# Patient Record
Sex: MALE | Race: OTHER | NOT HISPANIC OR LATINO | Employment: OTHER | URBAN - METROPOLITAN AREA
[De-identification: names, ages, dates, MRNs, and addresses within clinical notes are randomized per-mention and may not be internally consistent; named-entity substitution may affect disease eponyms.]

---

## 2020-04-09 ENCOUNTER — HOSPITAL ENCOUNTER (OUTPATIENT)
Facility: HOSPITAL | Age: 55
Discharge: HOME | End: 2020-04-10
Attending: EMERGENCY MEDICINE | Admitting: FAMILY MEDICINE
Payer: MEDICAID

## 2020-04-09 DIAGNOSIS — R00.1 BRADYCARDIA: ICD-10-CM

## 2020-04-09 DIAGNOSIS — R07.9 CHEST PAIN, UNSPECIFIED TYPE: ICD-10-CM

## 2020-04-09 DIAGNOSIS — I25.10 CORONARY ARTERY DISEASE, ANGINA PRESENCE UNSPECIFIED, UNSPECIFIED VESSEL OR LESION TYPE, UNSPECIFIED WHETHER NATIVE OR TRANSPLANTED HEART: ICD-10-CM

## 2020-04-09 DIAGNOSIS — R07.9 CHEST PAIN: Primary | ICD-10-CM

## 2020-04-09 PROBLEM — Z95.5 H/O HEART ARTERY STENT: Status: ACTIVE | Noted: 2020-04-09

## 2020-04-09 LAB
ALBUMIN SERPL BCP-MCNC: 3.7 G/DL (ref 3.5–5.2)
ALP SERPL-CCNC: 90 U/L (ref 55–135)
ALT SERPL W/O P-5'-P-CCNC: 14 U/L (ref 10–44)
ANION GAP SERPL CALC-SCNC: 8 MMOL/L (ref 8–16)
AST SERPL-CCNC: 17 U/L (ref 10–40)
BASOPHILS # BLD AUTO: 0.06 K/UL (ref 0–0.2)
BASOPHILS NFR BLD: 1 % (ref 0–1.9)
BILIRUB SERPL-MCNC: 0.3 MG/DL (ref 0.1–1)
BNP SERPL-MCNC: 140 PG/ML (ref 0–99)
BUN SERPL-MCNC: 17 MG/DL (ref 6–20)
CALCIUM SERPL-MCNC: 9.3 MG/DL (ref 8.7–10.5)
CHLORIDE SERPL-SCNC: 110 MMOL/L (ref 95–110)
CO2 SERPL-SCNC: 21 MMOL/L (ref 23–29)
CREAT SERPL-MCNC: 1.3 MG/DL (ref 0.5–1.4)
DIFFERENTIAL METHOD: ABNORMAL
EOSINOPHIL # BLD AUTO: 0.6 K/UL (ref 0–0.5)
EOSINOPHIL NFR BLD: 9.2 % (ref 0–8)
ERYTHROCYTE [DISTWIDTH] IN BLOOD BY AUTOMATED COUNT: 15.2 % (ref 11.5–14.5)
EST. GFR  (AFRICAN AMERICAN): >60 ML/MIN/1.73 M^2
EST. GFR  (NON AFRICAN AMERICAN): >60 ML/MIN/1.73 M^2
GLUCOSE SERPL-MCNC: 108 MG/DL (ref 70–110)
HCT VFR BLD AUTO: 37.7 % (ref 40–54)
HGB BLD-MCNC: 12.2 G/DL (ref 14–18)
IMM GRANULOCYTES # BLD AUTO: 0.02 K/UL (ref 0–0.04)
IMM GRANULOCYTES NFR BLD AUTO: 0.3 % (ref 0–0.5)
LYMPHOCYTES # BLD AUTO: 2.5 K/UL (ref 1–4.8)
LYMPHOCYTES NFR BLD: 40.4 % (ref 18–48)
MCH RBC QN AUTO: 27.7 PG (ref 27–31)
MCHC RBC AUTO-ENTMCNC: 32.4 G/DL (ref 32–36)
MCV RBC AUTO: 86 FL (ref 82–98)
MONOCYTES # BLD AUTO: 0.6 K/UL (ref 0.3–1)
MONOCYTES NFR BLD: 9.3 % (ref 4–15)
NEUTROPHILS # BLD AUTO: 2.5 K/UL (ref 1.8–7.7)
NEUTROPHILS NFR BLD: 39.8 % (ref 38–73)
NRBC BLD-RTO: 0 /100 WBC
PLATELET # BLD AUTO: 245 K/UL (ref 150–350)
PMV BLD AUTO: 9.3 FL (ref 9.2–12.9)
POTASSIUM SERPL-SCNC: 4.2 MMOL/L (ref 3.5–5.1)
PROT SERPL-MCNC: 7.2 G/DL (ref 6–8.4)
RBC # BLD AUTO: 4.4 M/UL (ref 4.6–6.2)
SARS-COV-2 RDRP RESP QL NAA+PROBE: NEGATIVE
SODIUM SERPL-SCNC: 139 MMOL/L (ref 136–145)
TROPONIN I SERPL DL<=0.01 NG/ML-MCNC: 0.02 NG/ML (ref 0–0.03)
WBC # BLD AUTO: 6.22 K/UL (ref 3.9–12.7)

## 2020-04-09 PROCEDURE — 85025 COMPLETE CBC W/AUTO DIFF WBC: CPT

## 2020-04-09 PROCEDURE — 96372 THER/PROPH/DIAG INJ SC/IM: CPT

## 2020-04-09 PROCEDURE — 83880 ASSAY OF NATRIURETIC PEPTIDE: CPT

## 2020-04-09 PROCEDURE — 84484 ASSAY OF TROPONIN QUANT: CPT

## 2020-04-09 PROCEDURE — 25000003 PHARM REV CODE 250: Performed by: PHYSICIAN ASSISTANT

## 2020-04-09 PROCEDURE — 63600175 PHARM REV CODE 636 W HCPCS: Performed by: NURSE PRACTITIONER

## 2020-04-09 PROCEDURE — U0002 COVID-19 LAB TEST NON-CDC: HCPCS

## 2020-04-09 PROCEDURE — 93010 EKG 12-LEAD: ICD-10-PCS | Mod: ,,, | Performed by: INTERNAL MEDICINE

## 2020-04-09 PROCEDURE — 99285 EMERGENCY DEPT VISIT HI MDM: CPT | Mod: 25

## 2020-04-09 PROCEDURE — 93010 ELECTROCARDIOGRAM REPORT: CPT | Mod: ,,, | Performed by: INTERNAL MEDICINE

## 2020-04-09 PROCEDURE — G0378 HOSPITAL OBSERVATION PER HR: HCPCS

## 2020-04-09 PROCEDURE — 80053 COMPREHEN METABOLIC PANEL: CPT

## 2020-04-09 PROCEDURE — 93005 ELECTROCARDIOGRAM TRACING: CPT

## 2020-04-09 RX ORDER — ATORVASTATIN CALCIUM 80 MG/1
80 TABLET, FILM COATED ORAL DAILY
COMMUNITY

## 2020-04-09 RX ORDER — ASPIRIN 325 MG
325 TABLET ORAL
Status: COMPLETED | OUTPATIENT
Start: 2020-04-09 | End: 2020-04-09

## 2020-04-09 RX ORDER — CARVEDILOL 3.12 MG/1
3.12 TABLET ORAL 2 TIMES DAILY WITH MEALS
Status: ON HOLD | COMMUNITY
End: 2020-04-10 | Stop reason: HOSPADM

## 2020-04-09 RX ORDER — LOSARTAN POTASSIUM 100 MG/1
100 TABLET ORAL DAILY
COMMUNITY

## 2020-04-09 RX ORDER — ENOXAPARIN SODIUM 100 MG/ML
40 INJECTION SUBCUTANEOUS EVERY 24 HOURS
Status: DISCONTINUED | OUTPATIENT
Start: 2020-04-09 | End: 2020-04-10 | Stop reason: HOSPADM

## 2020-04-09 RX ORDER — ONDANSETRON 2 MG/ML
4 INJECTION INTRAMUSCULAR; INTRAVENOUS EVERY 6 HOURS PRN
Status: DISCONTINUED | OUTPATIENT
Start: 2020-04-09 | End: 2020-04-10 | Stop reason: HOSPADM

## 2020-04-09 RX ORDER — MESALAMINE 500 MG/1
400 CAPSULE, EXTENDED RELEASE ORAL
COMMUNITY

## 2020-04-09 RX ORDER — NITROGLYCERIN 0.4 MG/1
0.4 TABLET SUBLINGUAL EVERY 5 MIN PRN
Status: DISCONTINUED | OUTPATIENT
Start: 2020-04-09 | End: 2020-04-10 | Stop reason: HOSPADM

## 2020-04-09 RX ORDER — NAPROXEN SODIUM 220 MG/1
81 TABLET, FILM COATED ORAL DAILY
Status: DISCONTINUED | OUTPATIENT
Start: 2020-04-10 | End: 2020-04-10 | Stop reason: HOSPADM

## 2020-04-09 RX ORDER — PANTOPRAZOLE SODIUM 40 MG/1
40 TABLET, DELAYED RELEASE ORAL DAILY
COMMUNITY

## 2020-04-09 RX ORDER — ACETAMINOPHEN 325 MG/1
650 TABLET ORAL EVERY 6 HOURS PRN
Status: DISCONTINUED | OUTPATIENT
Start: 2020-04-09 | End: 2020-04-10 | Stop reason: HOSPADM

## 2020-04-09 RX ORDER — ASPIRIN 81 MG/1
81 TABLET ORAL DAILY
COMMUNITY

## 2020-04-09 RX ORDER — CLOPIDOGREL BISULFATE 75 MG/1
75 TABLET ORAL DAILY
COMMUNITY

## 2020-04-09 RX ADMIN — ASPIRIN 325 MG ORAL TABLET 325 MG: 325 PILL ORAL at 07:04

## 2020-04-09 RX ADMIN — ENOXAPARIN SODIUM 40 MG: 100 INJECTION SUBCUTANEOUS at 11:04

## 2020-04-10 VITALS
OXYGEN SATURATION: 98 % | RESPIRATION RATE: 18 BRPM | SYSTOLIC BLOOD PRESSURE: 126 MMHG | WEIGHT: 158.94 LBS | HEIGHT: 66 IN | BODY MASS INDEX: 25.54 KG/M2 | DIASTOLIC BLOOD PRESSURE: 75 MMHG | HEART RATE: 58 BPM | TEMPERATURE: 98 F

## 2020-04-10 PROBLEM — I50.42 CHRONIC COMBINED SYSTOLIC AND DIASTOLIC CONGESTIVE HEART FAILURE: Status: ACTIVE | Noted: 2020-04-10

## 2020-04-10 PROBLEM — R00.1 BRADYCARDIA: Status: ACTIVE | Noted: 2020-04-10

## 2020-04-10 PROBLEM — R07.9 CHEST PAIN: Status: RESOLVED | Noted: 2020-04-09 | Resolved: 2020-04-10

## 2020-04-10 PROBLEM — J45.909 ASTHMA: Status: ACTIVE | Noted: 2020-04-10

## 2020-04-10 PROBLEM — I10 HYPERTENSION: Status: ACTIVE | Noted: 2020-04-10

## 2020-04-10 PROBLEM — I50.43 ACUTE ON CHRONIC COMBINED SYSTOLIC AND DIASTOLIC CONGESTIVE HEART FAILURE: Status: ACTIVE | Noted: 2020-04-10

## 2020-04-10 LAB
ALBUMIN SERPL BCP-MCNC: 3.8 G/DL (ref 3.5–5.2)
ALP SERPL-CCNC: 96 U/L (ref 55–135)
ALT SERPL W/O P-5'-P-CCNC: 15 U/L (ref 10–44)
ANION GAP SERPL CALC-SCNC: 8 MMOL/L (ref 8–16)
AORTIC ROOT ANNULUS: 3.46 CM
AORTIC VALVE CUSP SEPERATION: 1.38 CM
AST SERPL-CCNC: 18 U/L (ref 10–40)
AV INDEX (PROSTH): 0.74
AV MEAN GRADIENT: 6 MMHG
AV PEAK GRADIENT: 12 MMHG
AV REGURGITATION PRESSURE HALF TIME: 618 MS
AV VALVE AREA: 2.43 CM2
AV VELOCITY RATIO: 0.61
BASOPHILS # BLD AUTO: 0.03 K/UL (ref 0–0.2)
BASOPHILS NFR BLD: 0.5 % (ref 0–1.9)
BILIRUB SERPL-MCNC: 0.4 MG/DL (ref 0.1–1)
BSA FOR ECHO PROCEDURE: 1.83 M2
BUN SERPL-MCNC: 14 MG/DL (ref 6–20)
CALCIUM SERPL-MCNC: 9.5 MG/DL (ref 8.7–10.5)
CHLORIDE SERPL-SCNC: 111 MMOL/L (ref 95–110)
CO2 SERPL-SCNC: 22 MMOL/L (ref 23–29)
CREAT SERPL-MCNC: 1.3 MG/DL (ref 0.5–1.4)
CV ECHO LV RWT: 0.24 CM
DIFFERENTIAL METHOD: ABNORMAL
DOP CALC AO PEAK VEL: 1.73 M/S
DOP CALC AO VTI: 37.78 CM
DOP CALC LVOT AREA: 3.3 CM2
DOP CALC LVOT DIAMETER: 2.04 CM
DOP CALC LVOT PEAK VEL: 1.05 M/S
DOP CALC LVOT STROKE VOLUME: 91.77 CM3
DOP CALCLVOT PEAK VEL VTI: 28.09 CM
E WAVE DECELERATION TIME: 208.36 MSEC
E/A RATIO: 0.82
E/E' RATIO: 12.36 M/S
ECHO LV POSTERIOR WALL: 0.6 CM (ref 0.6–1.1)
EOSINOPHIL # BLD AUTO: 0.5 K/UL (ref 0–0.5)
EOSINOPHIL NFR BLD: 8.8 % (ref 0–8)
ERYTHROCYTE [DISTWIDTH] IN BLOOD BY AUTOMATED COUNT: 15.3 % (ref 11.5–14.5)
EST. GFR  (AFRICAN AMERICAN): >60 ML/MIN/1.73 M^2
EST. GFR  (NON AFRICAN AMERICAN): >60 ML/MIN/1.73 M^2
FRACTIONAL SHORTENING: 15 % (ref 28–44)
GLUCOSE SERPL-MCNC: 93 MG/DL (ref 70–110)
HCT VFR BLD AUTO: 40.8 % (ref 40–54)
HGB BLD-MCNC: 13.1 G/DL (ref 14–18)
IMM GRANULOCYTES # BLD AUTO: 0.02 K/UL (ref 0–0.04)
IMM GRANULOCYTES NFR BLD AUTO: 0.3 % (ref 0–0.5)
INTERVENTRICULAR SEPTUM: 0.9 CM (ref 0.6–1.1)
IVRT: 114.18 MSEC
LA MAJOR: 4.85 CM
LA MINOR: 4.61 CM
LA WIDTH: 3.62 CM
LEFT ATRIUM SIZE: 3.7 CM
LEFT ATRIUM VOLUME INDEX: 29.7 ML/M2
LEFT ATRIUM VOLUME: 53.82 CM3
LEFT INTERNAL DIMENSION IN SYSTOLE: 4.34 CM (ref 2.1–4)
LEFT VENTRICLE DIASTOLIC VOLUME INDEX: 74.71 ML/M2
LEFT VENTRICLE DIASTOLIC VOLUME: 135.53 ML
LEFT VENTRICLE MASS INDEX: 71 G/M2
LEFT VENTRICLE SYSTOLIC VOLUME INDEX: 46.9 ML/M2
LEFT VENTRICLE SYSTOLIC VOLUME: 85.03 ML
LEFT VENTRICULAR INTERNAL DIMENSION IN DIASTOLE: 5.1 CM (ref 3.5–6)
LEFT VENTRICULAR MASS: 129.43 G
LV LATERAL E/E' RATIO: 8.5 M/S
LV SEPTAL E/E' RATIO: 22.67 M/S
LYMPHOCYTES # BLD AUTO: 2.6 K/UL (ref 1–4.8)
LYMPHOCYTES NFR BLD: 42.1 % (ref 18–48)
MAGNESIUM SERPL-MCNC: 2 MG/DL (ref 1.6–2.6)
MCH RBC QN AUTO: 27.3 PG (ref 27–31)
MCHC RBC AUTO-ENTMCNC: 32.1 G/DL (ref 32–36)
MCV RBC AUTO: 85 FL (ref 82–98)
MONOCYTES # BLD AUTO: 0.5 K/UL (ref 0.3–1)
MONOCYTES NFR BLD: 8 % (ref 4–15)
MV PEAK A VEL: 0.83 M/S
MV PEAK E VEL: 0.68 M/S
NEUTROPHILS # BLD AUTO: 2.5 K/UL (ref 1.8–7.7)
NEUTROPHILS NFR BLD: 40.3 % (ref 38–73)
NRBC BLD-RTO: 0 /100 WBC
PHOSPHATE SERPL-MCNC: 2.8 MG/DL (ref 2.7–4.5)
PISA TR MAX VEL: 1.74 M/S
PLATELET # BLD AUTO: 246 K/UL (ref 150–350)
PMV BLD AUTO: 8.9 FL (ref 9.2–12.9)
POTASSIUM SERPL-SCNC: 4.2 MMOL/L (ref 3.5–5.1)
PROT SERPL-MCNC: 7.5 G/DL (ref 6–8.4)
PULM VEIN S/D RATIO: 0.97
PV PEAK D VEL: 0.34 M/S
PV PEAK S VEL: 0.33 M/S
RA MAJOR: 4.65 CM
RA PRESSURE: 3 MMHG
RA WIDTH: 2.85 CM
RBC # BLD AUTO: 4.79 M/UL (ref 4.6–6.2)
RIGHT VENTRICULAR END-DIASTOLIC DIMENSION: 2.51 CM
SODIUM SERPL-SCNC: 141 MMOL/L (ref 136–145)
TDI LATERAL: 0.08 M/S
TDI SEPTAL: 0.03 M/S
TDI: 0.06 M/S
TR MAX PG: 12 MMHG
TROPONIN I SERPL DL<=0.01 NG/ML-MCNC: 0.02 NG/ML (ref 0–0.03)
TROPONIN I SERPL DL<=0.01 NG/ML-MCNC: 0.02 NG/ML (ref 0–0.03)
TV REST PULMONARY ARTERY PRESSURE: 15 MMHG
WBC # BLD AUTO: 6.15 K/UL (ref 3.9–12.7)

## 2020-04-10 PROCEDURE — 25000003 PHARM REV CODE 250: Performed by: NURSE PRACTITIONER

## 2020-04-10 PROCEDURE — 83735 ASSAY OF MAGNESIUM: CPT

## 2020-04-10 PROCEDURE — G0378 HOSPITAL OBSERVATION PER HR: HCPCS

## 2020-04-10 PROCEDURE — 84100 ASSAY OF PHOSPHORUS: CPT

## 2020-04-10 PROCEDURE — 80053 COMPREHEN METABOLIC PANEL: CPT

## 2020-04-10 PROCEDURE — 99220 PR INITIAL OBSERVATION CARE,LEVL III: ICD-10-PCS | Mod: 25,,, | Performed by: INTERNAL MEDICINE

## 2020-04-10 PROCEDURE — 94761 N-INVAS EAR/PLS OXIMETRY MLT: CPT

## 2020-04-10 PROCEDURE — 36415 COLL VENOUS BLD VENIPUNCTURE: CPT

## 2020-04-10 PROCEDURE — 99220 PR INITIAL OBSERVATION CARE,LEVL III: CPT | Mod: 25,,, | Performed by: INTERNAL MEDICINE

## 2020-04-10 PROCEDURE — 85025 COMPLETE CBC W/AUTO DIFF WBC: CPT

## 2020-04-10 PROCEDURE — 84484 ASSAY OF TROPONIN QUANT: CPT | Mod: 91

## 2020-04-10 PROCEDURE — 84484 ASSAY OF TROPONIN QUANT: CPT

## 2020-04-10 PROCEDURE — 94760 N-INVAS EAR/PLS OXIMETRY 1: CPT

## 2020-04-10 RX ORDER — ATORVASTATIN CALCIUM 40 MG/1
80 TABLET, FILM COATED ORAL DAILY
Status: DISCONTINUED | OUTPATIENT
Start: 2020-04-10 | End: 2020-04-10 | Stop reason: HOSPADM

## 2020-04-10 RX ORDER — ISOSORBIDE MONONITRATE 30 MG/1
30 TABLET, EXTENDED RELEASE ORAL DAILY
Status: DISCONTINUED | OUTPATIENT
Start: 2020-04-10 | End: 2020-04-10 | Stop reason: HOSPADM

## 2020-04-10 RX ORDER — LOSARTAN POTASSIUM 50 MG/1
100 TABLET ORAL DAILY
Status: DISCONTINUED | OUTPATIENT
Start: 2020-04-10 | End: 2020-04-10 | Stop reason: HOSPADM

## 2020-04-10 RX ORDER — CLOPIDOGREL BISULFATE 75 MG/1
75 TABLET ORAL DAILY
Status: DISCONTINUED | OUTPATIENT
Start: 2020-04-10 | End: 2020-04-10 | Stop reason: HOSPADM

## 2020-04-10 RX ORDER — ISOSORBIDE MONONITRATE 30 MG/1
30 TABLET, EXTENDED RELEASE ORAL DAILY
Qty: 30 TABLET | Refills: 11 | Status: ON HOLD | OUTPATIENT
Start: 2020-04-10 | End: 2021-01-07 | Stop reason: HOSPADM

## 2020-04-10 RX ORDER — PANTOPRAZOLE SODIUM 40 MG/1
40 TABLET, DELAYED RELEASE ORAL DAILY
Status: DISCONTINUED | OUTPATIENT
Start: 2020-04-10 | End: 2020-04-10 | Stop reason: HOSPADM

## 2020-04-10 RX ORDER — NITROGLYCERIN 0.4 MG/1
0.4 TABLET SUBLINGUAL EVERY 5 MIN PRN
Qty: 30 TABLET | Refills: 0 | Status: SHIPPED | OUTPATIENT
Start: 2020-04-10 | End: 2021-04-10

## 2020-04-10 RX ADMIN — ATORVASTATIN CALCIUM 80 MG: 40 TABLET, FILM COATED ORAL at 08:04

## 2020-04-10 RX ADMIN — CLOPIDOGREL BISULFATE 75 MG: 75 TABLET ORAL at 08:04

## 2020-04-10 RX ADMIN — PANTOPRAZOLE SODIUM 40 MG: 40 TABLET, DELAYED RELEASE ORAL at 08:04

## 2020-04-10 RX ADMIN — ACETAMINOPHEN 650 MG: 325 TABLET ORAL at 12:04

## 2020-04-10 RX ADMIN — LOSARTAN POTASSIUM 100 MG: 50 TABLET ORAL at 08:04

## 2020-04-10 RX ADMIN — ASPIRIN 81 MG 81 MG: 81 TABLET ORAL at 08:04

## 2020-04-10 NOTE — H&P
[facility] Medicine  History & Physical    Patient Name: Beau Cowart  MRN: [id number]  Admission Date: 4/9/2020  Attending Physician: Shirley Muñoz*   Primary Care Provider: Primary Doctor No         Patient information was obtained from patient, past medical records and ER records.     Subjective:     Principal Problem:Chest pain    Chief Complaint:   Chief Complaint   Patient presents with    Chest Pain     c/o pain to his entire chest and headache x3 days. pain has been improving with nitro, but states he is now also feeling weak. history of cardiac bypass surgery and of needing 2 cardiac stents in the past        HPI: 54 y.o. male with past medical history of CAD s/p stents, asthma, colitis who  presents to the ED for evaluation of left-sided, intermittent, non- radiating chest pain with onset about 3 days ago.  Patient states that the pain is stabbing in nature. He states that when he takes his nitroglycerin medicine it improves the pain for some period of time as well as rest.  When the pain returns he reports associated headaches and weakness. He reports pain usually last 2-3 minutes.  Denies any shortness of breath, fever, chills, cough, congestion, nausea, vomiting, change in bladder habits or change in bowel habits.  ED findings: troponin negative , covid negative, EKG with ST depressions.  Patient admitted to [facility] medicine observation service, under my care, for further medical management.    Past Medical History:   Diagnosis Date    Asthma     Colitis     Coronary artery disease     Hypertension        Past Surgical History:   Procedure Laterality Date    CARDIAC SURGERY         Review of patient's allergies indicates:   Allergen Reactions    Singulair [montelukast] Rash       No current facility-administered medications on file prior to encounter.      Current Outpatient Medications on File Prior to Encounter   Medication Sig    aspirin  (ECOTRIN) 81 MG EC tablet Take 81 mg by mouth once daily.    atorvastatin (LIPITOR) 80 MG tablet Take 80 mg by mouth once daily.    carvediloL (COREG) 3.125 MG tablet Take 3.125 mg by mouth 2 (two) times daily with meals.    clopidogreL (PLAVIX) 75 mg tablet Take 75 mg by mouth once daily.    losartan (COZAAR) 100 MG tablet Take 100 mg by mouth once daily.    mesalamine (PENTASA) 500 MG CR capsule Take 400 mg by mouth. Takes two in the AM and 1 at night.    pantoprazole (PROTONIX) 40 MG tablet Take 40 mg by mouth once daily.     Family History     None        Tobacco Use    Smoking status: Former Smoker    Smokeless tobacco: Never Used   Substance and Sexual Activity    Alcohol use: Never     Frequency: Never    Drug use: Not on file    Sexual activity: Not on file     Review of Systems   Constitutional: Negative for chills and fever.   HENT: Negative for congestion, postnasal drip and rhinorrhea.    Eyes: Negative for visual disturbance.   Respiratory: Negative for chest tightness and shortness of breath.    Cardiovascular: Positive for chest pain. Negative for palpitations.   Gastrointestinal: Negative for abdominal distention, abdominal pain, nausea and vomiting.   Genitourinary: Negative for difficulty urinating.   Musculoskeletal: Negative for arthralgias and myalgias.   Skin: Negative for color change and wound.   Neurological: Positive for weakness and headaches.   Hematological: Does not bruise/bleed easily.   Psychiatric/Behavioral: Negative for agitation.     Objective:     Vital Signs (Most Recent):  Temp: 98 °F (36.7 °C) (04/09/20 2323)  Pulse: (!) 46 (04/09/20 2323)  Resp: 16 (04/09/20 2323)  BP: 136/76 (04/09/20 2323)  SpO2: 99 % (04/09/20 2323) Vital Signs (24h Range):  Temp:  [98 °F (36.7 °C)-98.6 °F (37 °C)] 98 °F (36.7 °C)  Pulse:  [46-62] 46  Resp:  [16-18] 16  SpO2:  [98 %-99 %] 99 %  BP: (134-136)/(68-78) 136/76     Weight: 72.1 kg (158 lb 15.2 oz)  Body mass index is 25.66  kg/m².    Physical Exam   Constitutional: He is oriented to person, place, and time. He appears well-developed and well-nourished.   HENT:   Head: Normocephalic and atraumatic.   Eyes: Pupils are equal, round, and reactive to light. EOM are normal.   Neck: Normal range of motion. Neck supple.   Cardiovascular: Bradycardia present.   No murmur heard.  Pulmonary/Chest: Effort normal and breath sounds normal. No respiratory distress.   Abdominal: Soft. Bowel sounds are normal.   Musculoskeletal: He exhibits no edema or deformity.   Neurological: He is alert and oriented to person, place, and time.   Skin: Skin is warm and dry.   Psychiatric: He has a normal mood and affect.         CRANIAL NERVES     CN III, IV, VI   Pupils are equal, round, and reactive to light.  Extraocular motions are normal.        Significant Labs:     Bilirubin:   Recent Labs   Lab 04/09/20 1959   BILITOT 0.3     BMP:   Recent Labs   Lab 04/09/20 1959         K 4.2      CO2 21*   BUN 17   CREATININE 1.3   CALCIUM 9.3     CBC:   Recent Labs   Lab 04/09/20 1959   WBC 6.22   HGB 12.2*   HCT 37.7*        CMP:   Recent Labs   Lab 04/09/20 1959      K 4.2      CO2 21*      BUN 17   CREATININE 1.3   CALCIUM 9.3   PROT 7.2   ALBUMIN 3.7   BILITOT 0.3   ALKPHOS 90   AST 17   ALT 14   ANIONGAP 8   EGFRNONAA >60     Cardiac Markers:   Recent Labs   Lab 04/09/20 1959   *     Troponin:   Recent Labs   Lab 04/09/20 1959   TROPONINI 0.017       Significant Imaging: I have reviewed all pertinent imaging results/findings within the past 24 hours.    Assessment/Plan:     * Chest pain  Bradycardia  Coronary artery disease  H/O heart artery stent  Hypertension    Continuous cardiac monitoring  Trend troponin x3  EKG: ST depressions  Heart Score 5  NPO p MN  Consult Cardiology  NTG prn  Oxygen prn  Hold home beta blocker  Continue statin, plavix, and losartan    Asthma  Stable, denies SOB  Monitor        VTE  Risk Mitigation (From admission, onward)         Ordered     enoxaparin injection 40 mg  Daily      04/09/20 1413                   Ting Shipman NP  Department of Hospital Medicine   [facility]

## 2020-04-10 NOTE — HPI
54 y.o. male with past medical history of CAD s/p stents, asthma, colitis who  presents to the ED for evaluation of left-sided, intermittent, non- radiating chest pain with onset about 3 days ago.  Patient states that the pain is stabbing in nature. He states that when he takes his nitroglycerin medicine it improves the pain for some period of time as well as rest.  When the pain returns he reports associated headaches and weakness. He reports pain usually last 2-3 minutes.  Denies any shortness of breath, fever, chills, cough, congestion, nausea, vomiting, change in bladder habits or change in bowel habits.  ED findings: troponin negative , covid negative, EKG with ST depressions.  Patient admitted to hospital medicine observation service, under my care, for further medical management.

## 2020-04-10 NOTE — SUBJECTIVE & OBJECTIVE
Past Medical History:   Diagnosis Date    Asthma     Colitis     Coronary artery disease     Hypertension        Past Surgical History:   Procedure Laterality Date    CARDIAC SURGERY         Review of patient's allergies indicates:   Allergen Reactions    Singulair [montelukast] Rash       No current facility-administered medications on file prior to encounter.      Current Outpatient Medications on File Prior to Encounter   Medication Sig    aspirin (ECOTRIN) 81 MG EC tablet Take 81 mg by mouth once daily.    atorvastatin (LIPITOR) 80 MG tablet Take 80 mg by mouth once daily.    carvediloL (COREG) 3.125 MG tablet Take 3.125 mg by mouth 2 (two) times daily with meals.    clopidogreL (PLAVIX) 75 mg tablet Take 75 mg by mouth once daily.    losartan (COZAAR) 100 MG tablet Take 100 mg by mouth once daily.    mesalamine (PENTASA) 500 MG CR capsule Take 400 mg by mouth. Takes two in the AM and 1 at night.    pantoprazole (PROTONIX) 40 MG tablet Take 40 mg by mouth once daily.     Family History     None        Tobacco Use    Smoking status: Former Smoker    Smokeless tobacco: Never Used   Substance and Sexual Activity    Alcohol use: Never     Frequency: Never    Drug use: Not on file    Sexual activity: Not on file     Review of Systems   Constitutional: Negative for chills and fever.   HENT: Negative for congestion, postnasal drip and rhinorrhea.    Eyes: Negative for visual disturbance.   Respiratory: Negative for chest tightness and shortness of breath.    Cardiovascular: Positive for chest pain. Negative for palpitations.   Gastrointestinal: Negative for abdominal distention, abdominal pain, nausea and vomiting.   Genitourinary: Negative for difficulty urinating.   Musculoskeletal: Negative for arthralgias and myalgias.   Skin: Negative for color change and wound.   Neurological: Positive for weakness and headaches.   Hematological: Does not bruise/bleed easily.   Psychiatric/Behavioral: Negative  for agitation.     Objective:     Vital Signs (Most Recent):  Temp: 98 °F (36.7 °C) (04/09/20 2323)  Pulse: (!) 46 (04/09/20 2323)  Resp: 16 (04/09/20 2323)  BP: 136/76 (04/09/20 2323)  SpO2: 99 % (04/09/20 2323) Vital Signs (24h Range):  Temp:  [98 °F (36.7 °C)-98.6 °F (37 °C)] 98 °F (36.7 °C)  Pulse:  [46-62] 46  Resp:  [16-18] 16  SpO2:  [98 %-99 %] 99 %  BP: (134-136)/(68-78) 136/76     Weight: 72.1 kg (158 lb 15.2 oz)  Body mass index is 25.66 kg/m².    Physical Exam   Constitutional: He is oriented to person, place, and time. He appears well-developed and well-nourished.   HENT:   Head: Normocephalic and atraumatic.   Eyes: Pupils are equal, round, and reactive to light. EOM are normal.   Neck: Normal range of motion. Neck supple.   Cardiovascular: Bradycardia present.   No murmur heard.  Pulmonary/Chest: Effort normal and breath sounds normal. No respiratory distress.   Abdominal: Soft. Bowel sounds are normal.   Musculoskeletal: He exhibits no edema or deformity.   Neurological: He is alert and oriented to person, place, and time.   Skin: Skin is warm and dry.   Psychiatric: He has a normal mood and affect.         CRANIAL NERVES     CN III, IV, VI   Pupils are equal, round, and reactive to light.  Extraocular motions are normal.        Significant Labs:     Bilirubin:   Recent Labs   Lab 04/09/20 1959   BILITOT 0.3     BMP:   Recent Labs   Lab 04/09/20 1959         K 4.2      CO2 21*   BUN 17   CREATININE 1.3   CALCIUM 9.3     CBC:   Recent Labs   Lab 04/09/20 1959   WBC 6.22   HGB 12.2*   HCT 37.7*        CMP:   Recent Labs   Lab 04/09/20 1959      K 4.2      CO2 21*      BUN 17   CREATININE 1.3   CALCIUM 9.3   PROT 7.2   ALBUMIN 3.7   BILITOT 0.3   ALKPHOS 90   AST 17   ALT 14   ANIONGAP 8   EGFRNONAA >60     Cardiac Markers:   Recent Labs   Lab 04/09/20 1959   *     Troponin:   Recent Labs   Lab 04/09/20 1959   TROPONINI 0.017       Significant  Imaging: I have reviewed all pertinent imaging results/findings within the past 24 hours.

## 2020-04-10 NOTE — CONSULTS
[facility]  Cardiology  Consult Note    Patient Name: Beau Cowart  MRN: [id number]  Admission Date: 4/9/2020  Hospital Length of Stay: 0 days  Code Status: Full Code   Attending Provider: Shirley Muñoz*   Consulting Provider: Lucas Rubio MD  Primary Care Physician: Primary Doctor No  Principal Problem:Chest pain    Patient information was obtained from patient and ER records.     Consults  Subjective:     Chief Complaint:  Chest pain     HPI:   55 y/o male with hx oc CAD s/p 4V CABG ~2002 with subsequent PCI ([facility] 2014 with occluded SVG-PDA, patent LIMA-LAD although distal to anastomosis LAD has , LCX patent, other patent grafts), HTN, HLD, Hep B, asthma who presented with CP. CP substernal with radiation to left side, improved with SL NTG. Trop neg x 3. ECG with ST depression inferolaterally (unclear if new since last ECG in Epic from 2014). No recurrent CP since admission. BP stable and HR 40-60's. Denies orthopnea, PND, syncope, palps, LE edema. Compliant with meds. 2DE today with mildly depressed EF 45-50% and WMA's. Covid negative.    Past Medical History:   Diagnosis Date    Asthma     Colitis     Coronary artery disease     Hypertension        Past Surgical History:   Procedure Laterality Date    CARDIAC SURGERY         Review of patient's allergies indicates:   Allergen Reactions    Singulair [montelukast] Rash       No current facility-administered medications on file prior to encounter.      Current Outpatient Medications on File Prior to Encounter   Medication Sig    aspirin (ECOTRIN) 81 MG EC tablet Take 81 mg by mouth once daily.    atorvastatin (LIPITOR) 80 MG tablet Take 80 mg by mouth once daily.    carvediloL (COREG) 3.125 MG tablet Take 3.125 mg by mouth 2 (two) times daily with meals.    clopidogreL (PLAVIX) 75 mg tablet Take 75 mg by mouth once daily.    losartan (COZAAR) 100 MG tablet Take 100 mg by mouth once daily.    mesalamine (PENTASA) 500  MG CR capsule Take 400 mg by mouth. Takes two in the AM and 1 at night.    pantoprazole (PROTONIX) 40 MG tablet Take 40 mg by mouth once daily.     Family History     None        Tobacco Use    Smoking status: Former Smoker    Smokeless tobacco: Never Used   Substance and Sexual Activity    Alcohol use: Never     Frequency: Never    Drug use: Not on file    Sexual activity: Not on file     Review of Systems   Constitution: Negative for malaise/fatigue.   HENT: Negative for congestion.    Eyes: Negative for blurred vision.   Cardiovascular: Positive for chest pain. Negative for claudication, cyanosis, dyspnea on exertion, irregular heartbeat, leg swelling, near-syncope, orthopnea, palpitations, paroxysmal nocturnal dyspnea and syncope.   Respiratory: Negative for shortness of breath.    Endocrine: Negative for polyuria.   Hematologic/Lymphatic: Negative for bleeding problem.   Skin: Negative for itching and rash.   Musculoskeletal: Negative for joint swelling, muscle cramps and muscle weakness.   Gastrointestinal: Negative for abdominal pain, hematemesis, hematochezia, melena, nausea and vomiting.   Genitourinary: Negative for dysuria and hematuria.   Neurological: Negative for dizziness, focal weakness, headaches, light-headedness, loss of balance and weakness.   Psychiatric/Behavioral: Negative for depression. The patient is not nervous/anxious.      Objective:     Vital Signs (Most Recent):  Temp: 98 °F (36.7 °C) (04/10/20 0724)  Pulse: (!) 51 (04/10/20 1147)  Resp: 18 (04/10/20 0724)  BP: 126/75 (04/10/20 0724)  SpO2: 97 % (04/10/20 0724) Vital Signs (24h Range):  Temp:  [97.3 °F (36.3 °C)-98.6 °F (37 °C)] 98 °F (36.7 °C)  Pulse:  [45-65] 51  Resp:  [16-18] 18  SpO2:  [97 %-99 %] 97 %  BP: (110-136)/(66-78) 126/75     Weight: 72.1 kg (158 lb 15.2 oz)  Body mass index is 25.66 kg/m².    SpO2: 97 %  O2 Device (Oxygen Therapy): room air      Intake/Output Summary (Last 24 hours) at 4/10/2020 2858  Last data  filed at 4/10/2020 1200  Gross per 24 hour   Intake 0 ml   Output 660 ml   Net -660 ml       Lines/Drains/Airways     None                 Physical Exam   Constitutional: He is oriented to person, place, and time. He appears well-developed and well-nourished.   HENT:   Head: Normocephalic and atraumatic.   Neck: No JVD present.   Pulmonary/Chest: Effort normal.   Musculoskeletal: He exhibits no edema.   Neurological: He is alert and oriented to person, place, and time.   Psychiatric: He has a normal mood and affect. His behavior is normal. Judgment and thought content normal.       Significant Labs:   BMP:   Recent Labs   Lab 04/09/20  1959 04/10/20  0706    93    141   K 4.2 4.2    111*   CO2 21* 22*   BUN 17 14   CREATININE 1.3 1.3   CALCIUM 9.3 9.5   MG  --  2.0   , CMP   Recent Labs   Lab 04/09/20  1959 04/10/20  0706    141   K 4.2 4.2    111*   CO2 21* 22*    93   BUN 17 14   CREATININE 1.3 1.3   CALCIUM 9.3 9.5   PROT 7.2 7.5   ALBUMIN 3.7 3.8   BILITOT 0.3 0.4   ALKPHOS 90 96   AST 17 18   ALT 14 15   ANIONGAP 8 8   ESTGFRAFRICA >60 >60   EGFRNONAA >60 >60   , CBC   Recent Labs   Lab 04/09/20  1959 04/10/20  0706   WBC 6.22 6.15   HGB 12.2* 13.1*   HCT 37.7* 40.8    246   , INR No results for input(s): INR, PROTIME in the last 48 hours., Lipid Panel No results for input(s): CHOL, HDL, LDLCALC, TRIG, CHOLHDL in the last 48 hours. and Troponin   Recent Labs   Lab 04/09/20  1959 04/10/20  0706 04/10/20  1154   TROPONINI 0.017 0.019 0.017         Assessment and Plan:     Active Diagnoses:    Diagnosis Date Noted POA    PRINCIPAL PROBLEM:  Chest pain [R07.9] 04/09/2020 Yes    Asthma [J45.909] 04/10/2020 Yes    Hypertension [I10] 04/10/2020 Yes    Bradycardia [R00.1] 04/10/2020 Yes    Acute on chronic combined systolic and diastolic congestive heart failure [I50.43] 04/10/2020 Yes    Coronary artery disease [I25.10] 04/09/2020 Yes    H/O heart artery stent  [Z95.5] 04/09/2020 Not Applicable      Problems Resolved During this Admission:     Chest Pain  -may be anginal in origin, given hx of ICM  -trop neg x 3 is reassuring  -angiogram report reviewed and pt has complicated coronary anatomy  -will defer further ischemic eval, as pt is now asymptomatic and due to Covid crisis  -recommend DAPT with ASA/plavix, statin  -hold BB for now in light of bradycardia  -Start Imdur 30 mg daily     CAD  -as above    ICM  -as above  -cont ARB    HTN  -stable, cont meds    HLD  -cont statin    F/u in cards clinic    VTE Risk Mitigation (From admission, onward)         Ordered     enoxaparin injection 40 mg  Daily      04/09/20 3352                Thank you for your consult. I will sign off. Please contact us if you have any additional questions.    Lucas Rubio MD  Cardiology   [facility]

## 2020-04-10 NOTE — NURSING
IV site and telemetry discontinued with no adverse reaction. Discharge papers will be reviewed with virtual nurse.

## 2020-04-10 NOTE — ASSESSMENT & PLAN NOTE
Echo completed on 4/10/20. EF 45%. Local segmental wall motion abnormalities noted. Follow up with cardiology outpatient. Hold beta blocker for now secondary to bradycardia. Monitor

## 2020-04-10 NOTE — ASSESSMENT & PLAN NOTE
Bradycardia  Coronary artery disease  H/O heart artery stent  Hypertension    Continuous cardiac monitoring  Trend troponin x3  EKG: ST depressions  Heart Score 5  NPO p MN  Consult Cardiology  NTG prn  Oxygen prn  Hold home beta blocker  Continue statin, plavix, and losartan

## 2020-04-10 NOTE — PLAN OF CARE
04/10/20 1630   Final Note   Assessment Type Final Discharge Note   Anticipated Discharge Disposition Home   What phone number can be called within the next 1-3 days to see how you are doing after discharge? [phone]   Hospital Follow Up  Appt(s) scheduled? Yes  (the pt will arrange the rest of her hospital follow ups)   Discharge plans and expectations educations in teach back method with documentation complete? Yes   Right Care Referral Info   Post Acute Recommendation No Care

## 2020-04-10 NOTE — PLAN OF CARE
Plan of care reviewed with patient, understanding verbalized.  Pt remains SB on tele, mid 40s and 50s while at rest. No complaints of CP or SOB overnight.  Bed alarm on, call light in reach, fall precautions in place.

## 2020-04-10 NOTE — HOSPITAL COURSE
The patient was admitted to [facility] Medicine for further care. Troponin levels were trended and remain negative. The patient denies chest pain or shortness of breath today. An echocardiogram was completed and reviewed with no acute findings. Cardiology recommends discharge home today with follow-up in cardiology clinic and continued medical management. Prescriptions for Imdur 30 mg and NTG were sent to the patient's pharmacy.

## 2020-04-10 NOTE — DISCHARGE INSTRUCTIONS
Please hold carvedilol until you follow up with cardiology in clinic. Monitor blood pressure and heart rate at home with blood pressure machine. The cardiology clinic will contact you for an appointment.

## 2020-04-10 NOTE — NURSING
Plan of care reviewed with patient. Voiced understanding. Bradycardic on monitor with no red alarms noted. Echo complete. No acute distress noted at this time. Side rails x3, bed low, call bell within reach. Maintain bed alarm for patient safety. Patient will be discharged today.

## 2020-04-10 NOTE — ED PROVIDER NOTES
Encounter Date: 4/9/2020       History     Chief Complaint   Patient presents with    Chest Pain     c/o pain to his entire chest and headache x3 days. pain has been improving with nitro, but states he is now also feeling weak. history of cardiac bypass surgery and of needing 2 cardiac stents in the past     Beau Cowart, a 54 y.o. male  has a past medical history of Asthma, Colitis, and Coronary artery disease.     He presents to the ED evaluation of left-sided chest pain times 2-3 days.  Patient states that the pain is stabbing in nature.  There is no radiation of the pain.  He states that when he takes his nitroglycerin medicine this improved the pain for some period of time as well as rest.  When the pain returns he also has headache.  Pain is there for 2-3 minutes and then resolved with the medication.  He states he has got a history of having stent placements.  In addition to chest pain he is also feeling weak.  Denies any shortness of breath or cough.      The history is provided by the patient.     Review of patient's allergies indicates:   Allergen Reactions    Singulair [montelukast] Rash     Past Medical History:   Diagnosis Date    Asthma     Colitis     Coronary artery disease      Past Surgical History:   Procedure Laterality Date    CARDIAC SURGERY       No family history on file.  Social History     Tobacco Use    Smoking status: Former Smoker   Substance Use Topics    Alcohol use: Never     Frequency: Never    Drug use: Not on file     Review of Systems   Constitutional: Negative for fever.   Respiratory: Negative for shortness of breath.    Cardiovascular: Positive for chest pain.   Gastrointestinal: Negative for nausea and vomiting.   Neurological: Positive for weakness and headaches.   All other systems reviewed and are negative.      Physical Exam     Initial Vitals [04/09/20 1902]   BP Pulse Resp Temp SpO2   134/78 62 18 98.6 °F (37 °C) 98 %      MAP       --         Physical  Exam    Nursing note and vitals reviewed.  Constitutional: He appears well-developed and well-nourished.   HENT:   Head: Normocephalic and atraumatic.   Right Ear: External ear normal.   Left Ear: External ear normal.   Nose: Nose normal.   Mouth/Throat: Oropharynx is clear and moist.   Eyes: EOM are normal.   Neck: Normal range of motion. Neck supple.   Cardiovascular: Normal rate and regular rhythm.   Pulmonary/Chest: Breath sounds normal. No respiratory distress. He has no wheezes. He has no rhonchi. He has no rales.   Abdominal: Soft. Bowel sounds are normal. He exhibits no distension. There is no tenderness. There is no rebound and no guarding.   Musculoskeletal: Normal range of motion. He exhibits no edema or tenderness.   Lymphadenopathy:     He has no cervical adenopathy.   Neurological: He is alert and oriented to person, place, and time. No cranial nerve deficit.   Skin: Skin is warm and dry. Capillary refill takes less than 2 seconds. No rash and no abscess noted. No erythema.   Psychiatric: He has a normal mood and affect. Thought content normal.         ED Course   Procedures  Labs Reviewed   CBC W/ AUTO DIFFERENTIAL - Abnormal; Notable for the following components:       Result Value    RBC 4.40 (*)     Hemoglobin 12.2 (*)     Hematocrit 37.7 (*)     RDW 15.2 (*)     Eos # 0.6 (*)     Eosinophil% 9.2 (*)     All other components within normal limits   COMPREHENSIVE METABOLIC PANEL - Abnormal; Notable for the following components:    CO2 21 (*)     All other components within normal limits   B-TYPE NATRIURETIC PEPTIDE - Abnormal; Notable for the following components:     (*)     All other components within normal limits   TROPONIN I   SARS-COV-2 RNA AMPLIFICATION, QUAL          Imaging Results          X-Ray Chest AP Portable (Final result)  Result time 04/09/20 20:02:19    Final result by Deshaun Allen MD (04/09/20 20:02:19)                 Impression:      1. No acute radiographic  abnormality.  2. Probable chronic and postoperative changes.  See above comments.  Recommend follow-up.      Electronically signed by: Deshaun Reyes  Date:    04/09/2020  Time:    20:02             Narrative:    EXAMINATION:  XR CHEST AP PORTABLE    CLINICAL HISTORY:  Chest Pain;    TECHNIQUE:  Single frontal view of the chest was performed.    COMPARISON:  None    FINDINGS:  Postoperative changes with sternotomy wires present.    Ovoid structure projects over the right infrahilar region is indeterminate.  This could represent a superficial object or possible calcification of a pulmonary bleb or postoperative change.  A pathologic mass is felt to be less likely.  Recommend surveillance.  No prior studies for comparison.    No evidence of any significant infiltrate or lobar consolidation.  No effusion or pneumothorax.  Lungs appear adequately expanded.    The cardiac silhouette is normal in size. The hilar and mediastinal contours are unremarkable.    Bones are intact.                                 Medical Decision Making:   Initial Assessment:   Chest pain, weakness  Differential Diagnosis:   ACS, angina, PE  Clinical Tests:   Lab Tests: Ordered and Reviewed  The following lab test(s) were unremarkable: CBC, CMP and Troponin  Radiological Study: Ordered and Reviewed  ED Management:  Patient presents to ED for evaluation of left sided CP.  Given history Heart score is 5.  Changes in EKG when compared to previous performed on 2014.  Patient will be admitted for further evaluation of his CP to [facility] hospitalist.      Additional MDM:   Heart Score:    History:          Moderately suspicious.  ECG:             Significant ST depression  Age:               45-65 years  Risk factors: >= 3 risk factors or history of atherosclerotic disease  Troponin:       Less than or equal to normal limit  Final Score: 6                                  Clinical Impression:       ICD-10-CM ICD-9-CM   1. Chest pain R07.9 786.50                                 Temitope Farley PA-C  04/09/20 [id number]

## 2020-04-10 NOTE — PLAN OF CARE
VN cued into room to complete admit assessment, VIP model introduced, VN working alongside bedside treatment team.  Plan of care reviewed with patient. Patient verbalized understanding. Patient informed of fall risk and fall precautions, call light within reach, 2x bed rails. Patient notified to ask staff for assistance and pt verbalized complete understanding.  Initiated care plan and teaching plan. Time allowed for questions. Will continue to monitor and intervene as needed.  Chart review complete.

## 2020-04-10 NOTE — ED NOTES
Pt presents to the ED with c/o chest pain x 3 days. Pt states he has a cardiac hx and has been taking nitro which relieves the pain but it is reoccuring. Today pt reports pain began around 5pm while at rest. States pain is located to entire chest and is non radiating. Pt also reports some sob while pain is occurring. Pt reports pain is very minimal at this time. Pt is able to speak in full complete sentences without distress. Pt also reports headache x3 days.

## 2020-04-10 NOTE — DISCHARGE SUMMARY
[facility]  Discharge Summary      Patient Name: Beau Cowart  MRN: [id number]  Admission Date: 4/9/2020  Hospital Length of Stay: 0 days  Discharge Date and Time:  04/10/2020 3:20 PM  Attending Physician: Shirley Muñoz*   Discharging Provider: Chloe Frazier NP  Primary Care Provider: Primary Doctor No      HPI:   54 y.o. male with past medical history of CAD s/p stents, asthma, colitis who  presents to the ED for evaluation of left-sided, intermittent, non- radiating chest pain with onset about 3 days ago.  Patient states that the pain is stabbing in nature. He states that when he takes his nitroglycerin medicine it improves the pain for some period of time as well as rest.  When the pain returns he reports associated headaches and weakness. He reports pain usually last 2-3 minutes.  Denies any shortness of breath, fever, chills, cough, congestion, nausea, vomiting, change in bladder habits or change in bowel habits.  ED findings: troponin negative , covid negative, EKG with ST depressions.  Patient admitted to [facility] medicine observation service, under my care, for further medical management.    * No surgery found *      Hospital Course:   The patient was admitted to [facility] for further care. Troponin levels were trended and remain negative. The patient denies chest pain or shortness of breath today. An echocardiogram was completed and reviewed with no acute findings. Cardiology recommends discharge home today with follow-up in cardiology clinic and continued medical management. Prescriptions for Imdur 30 mg and NTG were sent to the patient's pharmacy.      Consults:   Consults (From admission, onward)        Status Ordering Provider     Inpatient consult to [facility]  Once     Provider:  (Not yet assigned)    Acknowledged GREY KHOURY          Chronic combined systolic and diastolic congestive heart  failure    Echo completed on 4/10/20. EF 45%. Local segmental wall motion abnormalities noted. Follow up with cardiology outpatient. Hold beta blocker for now secondary to bradycardia. Monitor    Bradycardia  Hold beta blocker for now      Hypertension  Continue home medication      Asthma  Stable, denies SOB  Monitor      H/O heart artery stent  Continue plavix and aspirin      Coronary artery disease  Stop carvedilol for now, continue imdur as prescribed      Final Active Diagnoses:    Diagnosis Date Noted POA    Asthma [J45.909] 04/10/2020 Yes    Hypertension [I10] 04/10/2020 Yes    Bradycardia [R00.1] 04/10/2020 Yes    Chronic combined systolic and diastolic congestive heart failure [I50.42] 04/10/2020 Yes    Coronary artery disease [I25.10] 04/09/2020 Yes    H/O heart artery stent [Z95.5] 04/09/2020 Not Applicable      Problems Resolved During this Admission:    Diagnosis Date Noted Date Resolved POA    PRINCIPAL PROBLEM:  Chest pain [R07.9] 04/09/2020 04/10/2020 Yes       Discharged Condition: stable    Disposition: Home or Self Care    Follow Up:  Follow-up Information     Lucas Rubio MD In 2 weeks.    Specialties:  INTERVENTIONAL CARDIOLOGY, Cardiology  Why:  hospital follow up  Contact information:  [address]  [address]  [facility] [address] [phone]             Primary Care Provider In 1 week.    Why:  hospital follow-up               Patient Instructions:      Ambulatory referral/consult to Cardiology   Standing Status: Future   Referral Priority: Routine Referral Type: Consultation   Referral Reason: Specialty Services Required   Requested Specialty: Cardiology   Number of Visits Requested: 1     Diet Cardiac     Notify your health care provider if you experience any of the following:  difficulty breathing or increased cough     Notify your health care provider if you experience any of the following:  temperature >100.4     Notify your health care provider if you experience any of  the following:  persistent nausea and vomiting or diarrhea     Notify your health care provider if you experience any of the following:  severe uncontrolled pain     Notify your health care provider if you experience any of the following:  severe persistent headache     Notify your health care provider if you experience any of the following:  worsening rash     Notify your health care provider if you experience any of the following:  persistent dizziness, light-headedness, or visual disturbances     Notify your health care provider if you experience any of the following:  increased confusion or weakness     Activity as tolerated       Significant Diagnostic Studies: Labs:   CMP   Recent Labs   Lab 04/09/20  1959 04/10/20  0706    141   K 4.2 4.2    111*   CO2 21* 22*    93   BUN 17 14   CREATININE 1.3 1.3   CALCIUM 9.3 9.5   PROT 7.2 7.5   ALBUMIN 3.7 3.8   BILITOT 0.3 0.4   ALKPHOS 90 96   AST 17 18   ALT 14 15   ANIONGAP 8 8   ESTGFRAFRICA >60 >60   EGFRNONAA >60 >60   , CBC   Recent Labs   Lab 04/09/20  1959 04/10/20  0706   WBC 6.22 6.15   HGB 12.2* 13.1*   HCT 37.7* 40.8    246   , Lipid Panel No results found for: CHOL, HDL, LDLCALC, TRIG, CHOLHDL, Troponin   Recent Labs   Lab 04/10/20  1154   TROPONINI 0.017        Pending Diagnostic Studies:     None         Medications:  Reconciled Home Medications:      Medication List      START taking these medications    isosorbide mononitrate 30 MG 24 hr tablet  Commonly known as:  IMDUR  Take 1 tablet (30 mg total) by mouth once daily.     nitroGLYCERIN 0.4 MG SL tablet  Commonly known as:  NITROSTAT  Place 1 tablet (0.4 mg total) under the tongue every 5 (five) minutes as needed for Chest pain.        CONTINUE taking these medications    aspirin 81 MG EC tablet  Commonly known as:  ECOTRIN  Take 81 mg by mouth once daily.     atorvastatin 80 MG tablet  Commonly known as:  LIPITOR  Take 80 mg by mouth once daily.     clopidogreL 75 mg  tablet  Commonly known as:  PLAVIX  Take 75 mg by mouth once daily.     losartan 100 MG tablet  Commonly known as:  COZAAR  Take 100 mg by mouth once daily.     mesalamine 500 MG CR capsule  Commonly known as:  PENTASA  Take 400 mg by mouth. Takes two in the AM and 1 at night.     pantoprazole 40 MG tablet  Commonly known as:  PROTONIX  Take 40 mg by mouth once daily.        STOP taking these medications    carvediloL 3.125 MG tablet  Commonly known as:  COREG            Indwelling Lines/Drains at time of discharge:   Lines/Drains/Airways     None                 Time spent on the discharge of patient: 35 minutes  Patient was seen and examined on the date of discharge and determined to be suitable for discharge.         Chloe Frazier NP  Department of Hospital Medicine  [facility]

## 2021-01-06 ENCOUNTER — HOSPITAL ENCOUNTER (OUTPATIENT)
Facility: HOSPITAL | Age: 56
Discharge: HOME | End: 2021-01-07
Attending: EMERGENCY MEDICINE | Admitting: HOSPITALIST
Payer: MEDICAID

## 2021-01-06 DIAGNOSIS — R07.9 CHEST PAIN: Primary | ICD-10-CM

## 2021-01-06 DIAGNOSIS — Z95.5 H/O HEART ARTERY STENT: ICD-10-CM

## 2021-01-06 DIAGNOSIS — J45.909 ASTHMA, UNSPECIFIED ASTHMA SEVERITY, UNSPECIFIED WHETHER COMPLICATED, UNSPECIFIED WHETHER PERSISTENT: ICD-10-CM

## 2021-01-06 DIAGNOSIS — K52.9 COLITIS: ICD-10-CM

## 2021-01-06 DIAGNOSIS — R00.1 BRADYCARDIA: ICD-10-CM

## 2021-01-06 DIAGNOSIS — I10 HYPERTENSION, UNSPECIFIED TYPE: ICD-10-CM

## 2021-01-06 DIAGNOSIS — I50.42 CHRONIC COMBINED SYSTOLIC AND DIASTOLIC CONGESTIVE HEART FAILURE: ICD-10-CM

## 2021-01-06 DIAGNOSIS — I25.10 CORONARY ARTERY DISEASE: ICD-10-CM

## 2021-01-06 DIAGNOSIS — R07.9 CHEST PAIN, UNSPECIFIED TYPE: ICD-10-CM

## 2021-01-06 LAB
ALBUMIN SERPL BCP-MCNC: 3.4 G/DL (ref 3.5–5.2)
ALP SERPL-CCNC: 76 U/L (ref 55–135)
ALT SERPL W/O P-5'-P-CCNC: 22 U/L (ref 10–44)
ANION GAP SERPL CALC-SCNC: 7 MMOL/L (ref 8–16)
AST SERPL-CCNC: 18 U/L (ref 10–40)
BASOPHILS # BLD AUTO: 0.06 K/UL (ref 0–0.2)
BASOPHILS NFR BLD: 0.9 % (ref 0–1.9)
BILIRUB SERPL-MCNC: 0.3 MG/DL (ref 0.1–1)
BNP SERPL-MCNC: 37 PG/ML (ref 0–99)
BUN SERPL-MCNC: 24 MG/DL (ref 6–20)
CALCIUM SERPL-MCNC: 8.9 MG/DL (ref 8.7–10.5)
CHLORIDE SERPL-SCNC: 107 MMOL/L (ref 95–110)
CO2 SERPL-SCNC: 23 MMOL/L (ref 23–29)
CREAT SERPL-MCNC: 1.5 MG/DL (ref 0.5–1.4)
CTP QC/QA: YES
DIFFERENTIAL METHOD: ABNORMAL
EOSINOPHIL # BLD AUTO: 0.4 K/UL (ref 0–0.5)
EOSINOPHIL NFR BLD: 5.8 % (ref 0–8)
ERYTHROCYTE [DISTWIDTH] IN BLOOD BY AUTOMATED COUNT: 15.2 % (ref 11.5–14.5)
EST. GFR  (AFRICAN AMERICAN): 58.9 ML/MIN/1.73 M^2
EST. GFR  (NON AFRICAN AMERICAN): 50.9 ML/MIN/1.73 M^2
GLUCOSE SERPL-MCNC: 87 MG/DL (ref 70–110)
HCT VFR BLD AUTO: 37.8 % (ref 40–54)
HGB BLD-MCNC: 11.6 G/DL (ref 14–18)
IMM GRANULOCYTES # BLD AUTO: 0.03 K/UL (ref 0–0.04)
IMM GRANULOCYTES NFR BLD AUTO: 0.5 % (ref 0–0.5)
LYMPHOCYTES # BLD AUTO: 2.8 K/UL (ref 1–4.8)
LYMPHOCYTES NFR BLD: 43.4 % (ref 18–48)
MCH RBC QN AUTO: 27.5 PG (ref 27–31)
MCHC RBC AUTO-ENTMCNC: 30.7 G/DL (ref 32–36)
MCV RBC AUTO: 90 FL (ref 82–98)
MONOCYTES # BLD AUTO: 0.7 K/UL (ref 0.3–1)
MONOCYTES NFR BLD: 10.8 % (ref 4–15)
NEUTROPHILS # BLD AUTO: 2.5 K/UL (ref 1.8–7.7)
NEUTROPHILS NFR BLD: 38.6 % (ref 38–73)
NRBC BLD-RTO: 0 /100 WBC
PLATELET # BLD AUTO: 257 K/UL (ref 150–350)
PMV BLD AUTO: 9.1 FL (ref 9.2–12.9)
POTASSIUM SERPL-SCNC: 4.6 MMOL/L (ref 3.5–5.1)
PROT SERPL-MCNC: 7.3 G/DL (ref 6–8.4)
RBC # BLD AUTO: 4.22 M/UL (ref 4.6–6.2)
SARS-COV-2 RDRP RESP QL NAA+PROBE: NEGATIVE
SODIUM SERPL-SCNC: 137 MMOL/L (ref 136–145)
TROPONIN I SERPL DL<=0.01 NG/ML-MCNC: 0.01 NG/ML (ref 0–0.03)
TROPONIN I SERPL DL<=0.01 NG/ML-MCNC: 0.01 NG/ML (ref 0–0.03)
WBC # BLD AUTO: 6.41 K/UL (ref 3.9–12.7)

## 2021-01-06 PROCEDURE — 99284 PR EMERGENCY DEPT VISIT,LEVEL IV: ICD-10-PCS | Mod: ,,, | Performed by: EMERGENCY MEDICINE

## 2021-01-06 PROCEDURE — U0002 COVID-19 LAB TEST NON-CDC: HCPCS | Performed by: EMERGENCY MEDICINE

## 2021-01-06 PROCEDURE — 83880 ASSAY OF NATRIURETIC PEPTIDE: CPT

## 2021-01-06 PROCEDURE — 85025 COMPLETE CBC W/AUTO DIFF WBC: CPT

## 2021-01-06 PROCEDURE — G0378 HOSPITAL OBSERVATION PER HR: HCPCS

## 2021-01-06 PROCEDURE — 25000003 PHARM REV CODE 250: Performed by: EMERGENCY MEDICINE

## 2021-01-06 PROCEDURE — 93005 ELECTROCARDIOGRAM TRACING: CPT

## 2021-01-06 PROCEDURE — 99284 EMERGENCY DEPT VISIT MOD MDM: CPT | Mod: ,,, | Performed by: EMERGENCY MEDICINE

## 2021-01-06 PROCEDURE — 80053 COMPREHEN METABOLIC PANEL: CPT

## 2021-01-06 PROCEDURE — 99285 EMERGENCY DEPT VISIT HI MDM: CPT | Mod: 25

## 2021-01-06 PROCEDURE — 93010 ELECTROCARDIOGRAM REPORT: CPT | Mod: ,,, | Performed by: INTERNAL MEDICINE

## 2021-01-06 PROCEDURE — 84484 ASSAY OF TROPONIN QUANT: CPT | Mod: 91

## 2021-01-06 PROCEDURE — 93010 EKG 12-LEAD: ICD-10-PCS | Mod: ,,, | Performed by: INTERNAL MEDICINE

## 2021-01-06 RX ORDER — IPRATROPIUM BROMIDE AND ALBUTEROL SULFATE 2.5; .5 MG/3ML; MG/3ML
3 SOLUTION RESPIRATORY (INHALATION) EVERY 4 HOURS PRN
Status: DISCONTINUED | OUTPATIENT
Start: 2021-01-07 | End: 2021-01-07 | Stop reason: HOSPADM

## 2021-01-06 RX ORDER — ASPIRIN 325 MG
325 TABLET ORAL
Status: COMPLETED | OUTPATIENT
Start: 2021-01-06 | End: 2021-01-06

## 2021-01-06 RX ORDER — SODIUM CHLORIDE 0.9 % (FLUSH) 0.9 %
5 SYRINGE (ML) INJECTION
Status: DISCONTINUED | OUTPATIENT
Start: 2021-01-07 | End: 2021-01-07 | Stop reason: HOSPADM

## 2021-01-06 RX ORDER — ACETAMINOPHEN 325 MG/1
650 TABLET ORAL EVERY 8 HOURS PRN
Status: DISCONTINUED | OUTPATIENT
Start: 2021-01-07 | End: 2021-01-07 | Stop reason: HOSPADM

## 2021-01-06 RX ORDER — IBUPROFEN 200 MG
24 TABLET ORAL
Status: DISCONTINUED | OUTPATIENT
Start: 2021-01-07 | End: 2021-01-07 | Stop reason: HOSPADM

## 2021-01-06 RX ORDER — HYDROCODONE BITARTRATE AND ACETAMINOPHEN 5; 325 MG/1; MG/1
1 TABLET ORAL EVERY 6 HOURS PRN
Status: DISCONTINUED | OUTPATIENT
Start: 2021-01-07 | End: 2021-01-07 | Stop reason: HOSPADM

## 2021-01-06 RX ORDER — TALC
6 POWDER (GRAM) TOPICAL NIGHTLY PRN
Status: DISCONTINUED | OUTPATIENT
Start: 2021-01-07 | End: 2021-01-07 | Stop reason: HOSPADM

## 2021-01-06 RX ORDER — POLYETHYLENE GLYCOL 3350 17 G/17G
17 POWDER, FOR SOLUTION ORAL DAILY
Status: DISCONTINUED | OUTPATIENT
Start: 2021-01-07 | End: 2021-01-07 | Stop reason: HOSPADM

## 2021-01-06 RX ORDER — ONDANSETRON 8 MG/1
8 TABLET, ORALLY DISINTEGRATING ORAL EVERY 8 HOURS PRN
Status: DISCONTINUED | OUTPATIENT
Start: 2021-01-07 | End: 2021-01-07 | Stop reason: HOSPADM

## 2021-01-06 RX ORDER — BISACODYL 10 MG
10 SUPPOSITORY, RECTAL RECTAL DAILY PRN
Status: DISCONTINUED | OUTPATIENT
Start: 2021-01-07 | End: 2021-01-07 | Stop reason: HOSPADM

## 2021-01-06 RX ORDER — HEPARIN SODIUM 5000 [USP'U]/ML
5000 INJECTION, SOLUTION INTRAVENOUS; SUBCUTANEOUS EVERY 8 HOURS
Status: DISCONTINUED | OUTPATIENT
Start: 2021-01-07 | End: 2021-01-07 | Stop reason: HOSPADM

## 2021-01-06 RX ORDER — IBUPROFEN 200 MG
16 TABLET ORAL
Status: DISCONTINUED | OUTPATIENT
Start: 2021-01-07 | End: 2021-01-07 | Stop reason: HOSPADM

## 2021-01-06 RX ORDER — GLUCAGON 1 MG
1 KIT INJECTION
Status: DISCONTINUED | OUTPATIENT
Start: 2021-01-07 | End: 2021-01-07 | Stop reason: HOSPADM

## 2021-01-06 RX ORDER — ACETAMINOPHEN 325 MG/1
650 TABLET ORAL EVERY 4 HOURS PRN
Status: DISCONTINUED | OUTPATIENT
Start: 2021-01-07 | End: 2021-01-07 | Stop reason: HOSPADM

## 2021-01-06 RX ADMIN — ASPIRIN 325 MG ORAL TABLET 325 MG: 325 PILL ORAL at 05:01

## 2021-01-07 VITALS
HEIGHT: 66 IN | SYSTOLIC BLOOD PRESSURE: 101 MMHG | OXYGEN SATURATION: 96 % | TEMPERATURE: 96 F | DIASTOLIC BLOOD PRESSURE: 52 MMHG | WEIGHT: 156.75 LBS | HEART RATE: 54 BPM | RESPIRATION RATE: 18 BRPM | BODY MASS INDEX: 25.19 KG/M2

## 2021-01-07 PROBLEM — K52.9 COLITIS: Status: ACTIVE | Noted: 2021-01-07

## 2021-01-07 LAB
ANION GAP SERPL CALC-SCNC: 10 MMOL/L (ref 8–16)
ASCENDING AORTA: 3.36 CM
AV INDEX (PROSTH): 0.42
AV MEAN GRADIENT: 7 MMHG
AV PEAK GRADIENT: 13 MMHG
AV VALVE AREA: 1.46 CM2
AV VELOCITY RATIO: 0.36
BASOPHILS # BLD AUTO: 0.05 K/UL (ref 0–0.2)
BASOPHILS NFR BLD: 0.9 % (ref 0–1.9)
BSA FOR ECHO PROCEDURE: 1.82 M2
BUN SERPL-MCNC: 20 MG/DL (ref 6–20)
CALCIUM SERPL-MCNC: 8.7 MG/DL (ref 8.7–10.5)
CFR FLOW - ANTERIOR: 1.28
CFR FLOW - INFERIOR: 1.27
CFR FLOW - LATERAL: 1.25
CFR FLOW - MAX: 1.84
CFR FLOW - MIN: 0.9
CFR FLOW - SEPTAL: 1.6
CFR FLOW - WHOLE HEART: 1.35
CHLORIDE SERPL-SCNC: 111 MMOL/L (ref 95–110)
CHOLEST SERPL-MCNC: 173 MG/DL (ref 120–199)
CHOLEST/HDLC SERPL: 3.8 {RATIO} (ref 2–5)
CO2 SERPL-SCNC: 18 MMOL/L (ref 23–29)
CREAT SERPL-MCNC: 1.3 MG/DL (ref 0.5–1.4)
CV ECHO LV RWT: 0.26 CM
CV PHARM DOSE: 39.7 MG
CV STRESS BASE HR: 58 BPM
DIASTOLIC BLOOD PRESSURE: 89 MMHG
DIFFERENTIAL METHOD: ABNORMAL
DOP CALC AO PEAK VEL: 1.79 M/S
DOP CALC AO VTI: 39.08 CM
DOP CALC LVOT AREA: 3.5 CM2
DOP CALC LVOT DIAMETER: 2.1 CM
DOP CALC LVOT PEAK VEL: 0.65 M/S
DOP CALC LVOT STROKE VOLUME: 57.19 CM3
DOP CALCLVOT PEAK VEL VTI: 16.52 CM
E WAVE DECELERATION TIME: 286.87 MSEC
E/A RATIO: 1.15
E/E' RATIO: 14 M/S
ECHO LV POSTERIOR WALL: 0.79 CM (ref 0.6–1.1)
END DIASTOLIC INDEX-HIGH: 170 ML/M2
END SYSTOLIC INDEX-HIGH: 70 ML/M2
EOSINOPHIL # BLD AUTO: 0.3 K/UL (ref 0–0.5)
EOSINOPHIL NFR BLD: 5.9 % (ref 0–8)
ERYTHROCYTE [DISTWIDTH] IN BLOOD BY AUTOMATED COUNT: 15 % (ref 11.5–14.5)
EST. GFR  (AFRICAN AMERICAN): >60 ML/MIN/1.73 M^2
EST. GFR  (NON AFRICAN AMERICAN): >60 ML/MIN/1.73 M^2
ESTIMATED AVG GLUCOSE: 126 MG/DL (ref 68–131)
FRACTIONAL SHORTENING: 18 % (ref 28–44)
GLUCOSE SERPL-MCNC: 91 MG/DL (ref 70–110)
HBA1C MFR BLD HPLC: 6 % (ref 4–5.6)
HCT VFR BLD AUTO: 38.6 % (ref 40–54)
HDLC SERPL-MCNC: 46 MG/DL (ref 40–75)
HDLC SERPL: 26.6 % (ref 20–50)
HGB BLD-MCNC: 11.9 G/DL (ref 14–18)
IMM GRANULOCYTES # BLD AUTO: 0.02 K/UL (ref 0–0.04)
IMM GRANULOCYTES NFR BLD AUTO: 0.4 % (ref 0–0.5)
INTERVENTRICULAR SEPTUM: 0.77 CM (ref 0.6–1.1)
LA MAJOR: 4.38 CM
LA MINOR: 4.41 CM
LA WIDTH: 3.38 CM
LDLC SERPL CALC-MCNC: 97.6 MG/DL (ref 63–159)
LEFT ATRIUM SIZE: 4.08 CM
LEFT ATRIUM VOLUME INDEX MOD: 29.9 ML/M2
LEFT ATRIUM VOLUME INDEX: 28.6 ML/M2
LEFT ATRIUM VOLUME MOD: 54 CM3
LEFT ATRIUM VOLUME: 51.52 CM3
LEFT INTERNAL DIMENSION IN SYSTOLE: 4.97 CM (ref 2.1–4)
LEFT VENTRICLE DIASTOLIC VOLUME INDEX: 102.54 ML/M2
LEFT VENTRICLE DIASTOLIC VOLUME: 184.91 ML
LEFT VENTRICLE MASS INDEX: 102 G/M2
LEFT VENTRICLE SYSTOLIC VOLUME INDEX: 64.8 ML/M2
LEFT VENTRICLE SYSTOLIC VOLUME: 116.81 ML
LEFT VENTRICULAR INTERNAL DIMENSION IN DIASTOLE: 6.07 CM (ref 3.5–6)
LEFT VENTRICULAR MASS: 184.09 G
LV LATERAL E/E' RATIO: 11 M/S
LV SEPTAL E/E' RATIO: 19.25 M/S
LYMPHOCYTES # BLD AUTO: 2.5 K/UL (ref 1–4.8)
LYMPHOCYTES NFR BLD: 45.2 % (ref 18–48)
MAGNESIUM SERPL-MCNC: 1.9 MG/DL (ref 1.6–2.6)
MCH RBC QN AUTO: 27.7 PG (ref 27–31)
MCHC RBC AUTO-ENTMCNC: 30.8 G/DL (ref 32–36)
MCV RBC AUTO: 90 FL (ref 82–98)
MONOCYTES # BLD AUTO: 0.5 K/UL (ref 0.3–1)
MONOCYTES NFR BLD: 8.4 % (ref 4–15)
MV A" WAVE DURATION": 13.32 MSEC
MV PEAK A VEL: 0.67 M/S
MV PEAK E VEL: 0.77 M/S
NEUTROPHILS # BLD AUTO: 2.2 K/UL (ref 1.8–7.7)
NEUTROPHILS NFR BLD: 39.2 % (ref 38–73)
NONHDLC SERPL-MCNC: 127 MG/DL
NRBC BLD-RTO: 0 /100 WBC
NUC REST DIASTOLIC VOLUME INDEX: 131
NUC REST EJECTION FRACTION: 42
NUC REST SYSTOLIC VOLUME INDEX: 76
NUC STRESS DIASTOLIC VOLUME INDEX: 135
NUC STRESS EJECTION FRACTION: 48 %
NUC STRESS SYSTOLIC VOLUME INDEX: 70
OHS CV CPX 85 PERCENT MAX PREDICTED HEART RATE MALE: 139
OHS CV CPX MAX PREDICTED HEART RATE: 163
OHS CV CPX PATIENT IS FEMALE: 0
OHS CV CPX PATIENT IS MALE: 1
OHS CV CPX PEAK DIASTOLIC BLOOD PRESSURE: 70 MMHG
OHS CV CPX PEAK HEAR RATE: 64 BPM
OHS CV CPX PEAK RATE PRESSURE PRODUCT: 7488
OHS CV CPX PEAK SYSTOLIC BLOOD PRESSURE: 117 MMHG
OHS CV CPX PERCENT MAX PREDICTED HEART RATE ACHIEVED: 39
OHS CV CPX RATE PRESSURE PRODUCT PRESENTING: 6844
PERFUSION DEFECT 1 SIZE IN %: 15 %
PERFUSION DEFECT SIZE WORSENS % 1: 25 %
PHOSPHATE SERPL-MCNC: 3 MG/DL (ref 2.7–4.5)
PISA TR MAX VEL: 2.25 M/S
PLATELET # BLD AUTO: 237 K/UL (ref 150–350)
PMV BLD AUTO: 9.1 FL (ref 9.2–12.9)
POCT GLUCOSE: 111 MG/DL (ref 70–110)
POCT GLUCOSE: 78 MG/DL (ref 70–110)
POCT GLUCOSE: 88 MG/DL (ref 70–110)
POTASSIUM SERPL-SCNC: 3.8 MMOL/L (ref 3.5–5.1)
PULM VEIN S/D RATIO: 0.77
PV PEAK D VEL: 0.43 M/S
PV PEAK S VEL: 0.33 M/S
RA MAJOR: 3.69 CM
RA PRESSURE: 3 MMHG
RA WIDTH: 3.38 CM
RBC # BLD AUTO: 4.3 M/UL (ref 4.6–6.2)
REST FLOW - ANTERIOR: 1.22 CC/MIN/G
REST FLOW - INFERIOR: 0.72 CC/MIN/G
REST FLOW - LATERAL: 0.88 CC/MIN/G
REST FLOW - MAX: 1.68 CC/MIN/G
REST FLOW - MIN: 0.4 CC/MIN/G
REST FLOW - SEPTAL: 1.14 CC/MIN/G
REST FLOW - WHOLE HEART: 0.99 CC/MIN/G
RETIRED EF AND QEF - SEE NOTES: 51 %
RIGHT VENTRICULAR END-DIASTOLIC DIMENSION: 2.63 CM
RV TISSUE DOPPLER FREE WALL SYSTOLIC VELOCITY 1 (APICAL 4 CHAMBER VIEW): 7.77 CM/S
SINUS: 3.13 CM
SODIUM SERPL-SCNC: 139 MMOL/L (ref 136–145)
STJ: 2.65 CM
STRESS FLOW - ANTERIOR: 1.55 CC/MIN/G
STRESS FLOW - INFERIOR: 0.9 CC/MIN/G
STRESS FLOW - LATERAL: 1.11 CC/MIN/G
STRESS FLOW - MAX: 2.35 CC/MIN/G
STRESS FLOW - MIN: 0.53 CC/MIN/G
STRESS FLOW - SEPTAL: 1.83 CC/MIN/G
STRESS FLOW - WHOLE HEART: 1.35 CC/MIN/G
SYSTOLIC BLOOD PRESSURE: 118 MMHG
TDI LATERAL: 0.07 M/S
TDI SEPTAL: 0.04 M/S
TDI: 0.06 M/S
TR MAX PG: 20 MMHG
TRICUSPID ANNULAR PLANE SYSTOLIC EXCURSION: 1.56 CM
TRIGL SERPL-MCNC: 147 MG/DL (ref 30–150)
TROPONIN I SERPL DL<=0.01 NG/ML-MCNC: 0.01 NG/ML (ref 0–0.03)
TV REST PULMONARY ARTERY PRESSURE: 23 MMHG
WBC # BLD AUTO: 5.57 K/UL (ref 3.9–12.7)

## 2021-01-07 PROCEDURE — 96375 TX/PRO/DX INJ NEW DRUG ADDON: CPT

## 2021-01-07 PROCEDURE — 99204 PR OFFICE/OUTPT VISIT, NEW, LEVL IV, 45-59 MIN: ICD-10-PCS | Mod: ,,, | Performed by: INTERNAL MEDICINE

## 2021-01-07 PROCEDURE — 25000003 PHARM REV CODE 250: Performed by: NURSE PRACTITIONER

## 2021-01-07 PROCEDURE — 83735 ASSAY OF MAGNESIUM: CPT

## 2021-01-07 PROCEDURE — 84484 ASSAY OF TROPONIN QUANT: CPT

## 2021-01-07 PROCEDURE — 36415 COLL VENOUS BLD VENIPUNCTURE: CPT

## 2021-01-07 PROCEDURE — 80048 BASIC METABOLIC PNL TOTAL CA: CPT

## 2021-01-07 PROCEDURE — 99220 PR INITIAL OBSERVATION CARE,LEVL III: CPT | Mod: ,,, | Performed by: PHYSICIAN ASSISTANT

## 2021-01-07 PROCEDURE — 99220 PR INITIAL OBSERVATION CARE,LEVL III: ICD-10-PCS | Mod: ,,, | Performed by: PHYSICIAN ASSISTANT

## 2021-01-07 PROCEDURE — 25000003 PHARM REV CODE 250: Performed by: PHYSICIAN ASSISTANT

## 2021-01-07 PROCEDURE — G0378 HOSPITAL OBSERVATION PER HR: HCPCS

## 2021-01-07 PROCEDURE — 80061 LIPID PANEL: CPT

## 2021-01-07 PROCEDURE — 93010 ELECTROCARDIOGRAM REPORT: CPT | Mod: ,,, | Performed by: INTERNAL MEDICINE

## 2021-01-07 PROCEDURE — 96374 THER/PROPH/DIAG INJ IV PUSH: CPT | Mod: 59

## 2021-01-07 PROCEDURE — 93010 EKG 12-LEAD: ICD-10-PCS | Mod: ,,, | Performed by: INTERNAL MEDICINE

## 2021-01-07 PROCEDURE — 93005 ELECTROCARDIOGRAM TRACING: CPT | Mod: 59

## 2021-01-07 PROCEDURE — 63600175 PHARM REV CODE 636 W HCPCS: Performed by: PHYSICIAN ASSISTANT

## 2021-01-07 PROCEDURE — 99204 OFFICE O/P NEW MOD 45 MIN: CPT | Mod: ,,, | Performed by: INTERNAL MEDICINE

## 2021-01-07 PROCEDURE — 85025 COMPLETE CBC W/AUTO DIFF WBC: CPT

## 2021-01-07 PROCEDURE — 96372 THER/PROPH/DIAG INJ SC/IM: CPT | Mod: 59

## 2021-01-07 PROCEDURE — 84100 ASSAY OF PHOSPHORUS: CPT

## 2021-01-07 PROCEDURE — 83036 HEMOGLOBIN GLYCOSYLATED A1C: CPT

## 2021-01-07 PROCEDURE — 63600175 PHARM REV CODE 636 W HCPCS: Performed by: NURSE PRACTITIONER

## 2021-01-07 PROCEDURE — 63600175 PHARM REV CODE 636 W HCPCS: Performed by: INTERNAL MEDICINE

## 2021-01-07 RX ORDER — CLOPIDOGREL BISULFATE 75 MG/1
75 TABLET ORAL DAILY
Status: DISCONTINUED | OUTPATIENT
Start: 2021-01-07 | End: 2021-01-07 | Stop reason: HOSPADM

## 2021-01-07 RX ORDER — ASPIRIN 81 MG/1
81 TABLET ORAL DAILY
Status: DISCONTINUED | OUTPATIENT
Start: 2021-01-07 | End: 2021-01-07 | Stop reason: HOSPADM

## 2021-01-07 RX ORDER — ISOSORBIDE MONONITRATE 60 MG/1
60 TABLET, EXTENDED RELEASE ORAL DAILY
Qty: 30 TABLET | Refills: 11 | Status: SHIPPED | OUTPATIENT
Start: 2021-01-08 | End: 2022-01-08

## 2021-01-07 RX ORDER — MAGNESIUM SULFATE HEPTAHYDRATE 40 MG/ML
2 INJECTION, SOLUTION INTRAVENOUS ONCE
Status: COMPLETED | OUTPATIENT
Start: 2021-01-07 | End: 2021-01-07

## 2021-01-07 RX ORDER — DIPYRIDAMOLE 5 MG/ML
39.71 INJECTION INTRAVENOUS ONCE
Status: COMPLETED | OUTPATIENT
Start: 2021-01-07 | End: 2021-01-07

## 2021-01-07 RX ORDER — PANTOPRAZOLE SODIUM 40 MG/1
40 TABLET, DELAYED RELEASE ORAL DAILY
Status: DISCONTINUED | OUTPATIENT
Start: 2021-01-07 | End: 2021-01-07 | Stop reason: HOSPADM

## 2021-01-07 RX ORDER — MESALAMINE 400 MG/1
800 CAPSULE, DELAYED RELEASE ORAL DAILY
Status: DISCONTINUED | OUTPATIENT
Start: 2021-01-07 | End: 2021-01-07 | Stop reason: HOSPADM

## 2021-01-07 RX ORDER — ATORVASTATIN CALCIUM 40 MG/1
80 TABLET, FILM COATED ORAL DAILY
Status: DISCONTINUED | OUTPATIENT
Start: 2021-01-07 | End: 2021-01-07 | Stop reason: HOSPADM

## 2021-01-07 RX ORDER — POTASSIUM CHLORIDE 750 MG/1
20 CAPSULE, EXTENDED RELEASE ORAL ONCE
Status: COMPLETED | OUTPATIENT
Start: 2021-01-07 | End: 2021-01-07

## 2021-01-07 RX ORDER — ISOSORBIDE MONONITRATE 60 MG/1
60 TABLET, EXTENDED RELEASE ORAL DAILY
Status: DISCONTINUED | OUTPATIENT
Start: 2021-01-08 | End: 2021-01-07 | Stop reason: HOSPADM

## 2021-01-07 RX ORDER — ISOSORBIDE MONONITRATE 30 MG/1
30 TABLET, EXTENDED RELEASE ORAL DAILY
Status: DISCONTINUED | OUTPATIENT
Start: 2021-01-07 | End: 2021-01-07

## 2021-01-07 RX ORDER — CARVEDILOL 3.12 MG/1
3.12 TABLET ORAL 2 TIMES DAILY WITH MEALS
COMMUNITY

## 2021-01-07 RX ORDER — MESALAMINE 400 MG/1
400 CAPSULE, DELAYED RELEASE ORAL NIGHTLY
Status: DISCONTINUED | OUTPATIENT
Start: 2021-01-07 | End: 2021-01-07 | Stop reason: HOSPADM

## 2021-01-07 RX ORDER — CARVEDILOL 3.12 MG/1
3.12 TABLET ORAL 2 TIMES DAILY
Status: DISCONTINUED | OUTPATIENT
Start: 2021-01-07 | End: 2021-01-07 | Stop reason: HOSPADM

## 2021-01-07 RX ORDER — NITROGLYCERIN 0.4 MG/1
0.4 TABLET SUBLINGUAL EVERY 5 MIN PRN
Status: DISCONTINUED | OUTPATIENT
Start: 2021-01-07 | End: 2021-01-07 | Stop reason: HOSPADM

## 2021-01-07 RX ORDER — AMINOPHYLLINE 25 MG/ML
75 INJECTION, SOLUTION INTRAVENOUS ONCE
Status: COMPLETED | OUTPATIENT
Start: 2021-01-07 | End: 2021-01-07

## 2021-01-07 RX ORDER — LOSARTAN POTASSIUM 50 MG/1
100 TABLET ORAL DAILY
Status: DISCONTINUED | OUTPATIENT
Start: 2021-01-07 | End: 2021-01-07 | Stop reason: HOSPADM

## 2021-01-07 RX ADMIN — CLOPIDOGREL 75 MG: 75 TABLET, FILM COATED ORAL at 09:01

## 2021-01-07 RX ADMIN — PANTOPRAZOLE SODIUM 40 MG: 40 TABLET, DELAYED RELEASE ORAL at 09:01

## 2021-01-07 RX ADMIN — LOSARTAN POTASSIUM 100 MG: 50 TABLET, FILM COATED ORAL at 09:01

## 2021-01-07 RX ADMIN — AMINOPHYLLINE 75 MG: 25 INJECTION, SOLUTION INTRAVENOUS at 12:01

## 2021-01-07 RX ADMIN — POLYETHYLENE GLYCOL 3350 17 G: 17 POWDER, FOR SOLUTION ORAL at 09:01

## 2021-01-07 RX ADMIN — MAGNESIUM SULFATE 2 G: 2 INJECTION INTRAVENOUS at 12:01

## 2021-01-07 RX ADMIN — POTASSIUM CHLORIDE 20 MEQ: 10 CAPSULE, COATED, EXTENDED RELEASE ORAL at 09:01

## 2021-01-07 RX ADMIN — MESALAMINE 400 MG: 400 CAPSULE, DELAYED RELEASE ORAL at 02:01

## 2021-01-07 RX ADMIN — ATORVASTATIN CALCIUM 80 MG: 40 TABLET, FILM COATED ORAL at 09:01

## 2021-01-07 RX ADMIN — ISOSORBIDE MONONITRATE 30 MG: 30 TABLET, EXTENDED RELEASE ORAL at 09:01

## 2021-01-07 RX ADMIN — DIPYRIDAMOLE 39.7 MG: 5 INJECTION INTRAVENOUS at 11:01

## 2021-01-07 RX ADMIN — HEPARIN SODIUM 5000 UNITS: 5000 INJECTION INTRAVENOUS; SUBCUTANEOUS at 06:01

## 2021-01-07 RX ADMIN — MESALAMINE 800 MG: 400 CAPSULE, DELAYED RELEASE ORAL at 09:01

## 2021-01-07 RX ADMIN — HEPARIN SODIUM 5000 UNITS: 5000 INJECTION INTRAVENOUS; SUBCUTANEOUS at 02:01

## 2021-01-07 RX ADMIN — ASPIRIN 81 MG: 81 TABLET, COATED ORAL at 09:01

## 2021-02-02 ENCOUNTER — OFFICE VISIT (OUTPATIENT)
Dept: URGENT CARE | Facility: CLINIC | Age: 56
End: 2021-02-02
Payer: MEDICAID

## 2021-02-02 VITALS
DIASTOLIC BLOOD PRESSURE: 71 MMHG | RESPIRATION RATE: 18 BRPM | WEIGHT: 156 LBS | SYSTOLIC BLOOD PRESSURE: 116 MMHG | HEART RATE: 70 BPM | HEIGHT: 66 IN | TEMPERATURE: 99 F | BODY MASS INDEX: 25.07 KG/M2 | OXYGEN SATURATION: 98 %

## 2021-02-02 DIAGNOSIS — R21 RASH: Primary | ICD-10-CM

## 2021-02-02 DIAGNOSIS — B02.8 HERPES ZOSTER WITH OTHER COMPLICATION: ICD-10-CM

## 2021-02-02 PROCEDURE — 99213 OFFICE O/P EST LOW 20 MIN: CPT | Mod: S$GLB,,, | Performed by: FAMILY MEDICINE

## 2021-02-02 PROCEDURE — 99213 PR OFFICE/OUTPT VISIT, EST, LEVL III, 20-29 MIN: ICD-10-PCS | Mod: S$GLB,,, | Performed by: FAMILY MEDICINE

## 2021-02-02 RX ORDER — HYDROCODONE BITARTRATE AND ACETAMINOPHEN 10; 325 MG/1; MG/1
1 TABLET ORAL EVERY 8 HOURS PRN
Qty: 20 TABLET | Refills: 0 | Status: SHIPPED | OUTPATIENT
Start: 2021-02-02

## 2021-02-02 RX ORDER — KETOROLAC TROMETHAMINE 30 MG/ML
30 INJECTION, SOLUTION INTRAMUSCULAR; INTRAVENOUS
Status: COMPLETED | OUTPATIENT
Start: 2021-02-02 | End: 2021-02-02

## 2021-02-02 RX ADMIN — KETOROLAC TROMETHAMINE 30 MG: 30 INJECTION, SOLUTION INTRAMUSCULAR; INTRAVENOUS at 08:02

## 2021-03-16 ENCOUNTER — OFFICE VISIT (OUTPATIENT)
Dept: URGENT CARE | Facility: CLINIC | Age: 56
End: 2021-03-16
Payer: MEDICAID

## 2021-03-16 VITALS
HEIGHT: 66 IN | RESPIRATION RATE: 16 BRPM | OXYGEN SATURATION: 97 % | BODY MASS INDEX: 25.07 KG/M2 | TEMPERATURE: 98 F | DIASTOLIC BLOOD PRESSURE: 82 MMHG | HEART RATE: 65 BPM | SYSTOLIC BLOOD PRESSURE: 144 MMHG | WEIGHT: 156 LBS

## 2021-03-16 DIAGNOSIS — B02.29 POSTHERPETIC NEURALGIA: ICD-10-CM

## 2021-03-16 DIAGNOSIS — Z11.52 ENCOUNTER FOR SCREENING LABORATORY TESTING FOR COVID-19 VIRUS: Primary | ICD-10-CM

## 2021-03-16 LAB
CTP QC/QA: YES
SARS-COV-2 RDRP RESP QL NAA+PROBE: NEGATIVE

## 2021-03-16 PROCEDURE — 99213 OFFICE O/P EST LOW 20 MIN: CPT | Mod: S$GLB,,, | Performed by: FAMILY MEDICINE

## 2021-03-16 PROCEDURE — U0002: ICD-10-PCS | Mod: QW,S$GLB,, | Performed by: FAMILY MEDICINE

## 2021-03-16 PROCEDURE — U0002 COVID-19 LAB TEST NON-CDC: HCPCS | Mod: QW,S$GLB,, | Performed by: FAMILY MEDICINE

## 2021-03-16 PROCEDURE — 99213 PR OFFICE/OUTPT VISIT, EST, LEVL III, 20-29 MIN: ICD-10-PCS | Mod: S$GLB,,, | Performed by: FAMILY MEDICINE

## 2021-03-16 RX ORDER — ACETAMINOPHEN AND CODEINE PHOSPHATE 300; 30 MG/1; MG/1
TABLET ORAL
Qty: 30 TABLET | Refills: 0 | Status: SHIPPED | OUTPATIENT
Start: 2021-03-16

## 2021-03-16 RX ORDER — GABAPENTIN 600 MG/1
600 TABLET ORAL 3 TIMES DAILY
Qty: 90 TABLET | Refills: 1 | Status: SHIPPED | OUTPATIENT
Start: 2021-03-16 | End: 2022-03-16

## 2021-03-16 RX ORDER — GABAPENTIN 300 MG/1
300 CAPSULE ORAL 3 TIMES DAILY
COMMUNITY

## 2021-03-30 ENCOUNTER — IMMUNIZATION (OUTPATIENT)
Dept: PRIMARY CARE CLINIC | Facility: CLINIC | Age: 56
End: 2021-03-30
Payer: MEDICAID

## 2021-03-30 DIAGNOSIS — Z23 NEED FOR VACCINATION: Primary | ICD-10-CM

## 2021-03-30 PROCEDURE — 0011A PR IMMUNIZ ADMIN, SARS-COV-2 COVID-19 VACC, 100MCG/0.5ML, 1ST DOSE: ICD-10-PCS | Mod: CV19,S$GLB,, | Performed by: INTERNAL MEDICINE

## 2021-03-30 PROCEDURE — 91301 PR SARS-COV-2 COVID-19 VACCINE, NO PRSV, 100MCG/0.5ML, IM: ICD-10-PCS | Mod: S$GLB,,, | Performed by: INTERNAL MEDICINE

## 2021-03-30 PROCEDURE — 0011A PR IMMUNIZ ADMIN, SARS-COV-2 COVID-19 VACC, 100MCG/0.5ML, 1ST DOSE: CPT | Mod: CV19,S$GLB,, | Performed by: INTERNAL MEDICINE

## 2021-03-30 PROCEDURE — 91301 PR SARS-COV-2 COVID-19 VACCINE, NO PRSV, 100MCG/0.5ML, IM: CPT | Mod: S$GLB,,, | Performed by: INTERNAL MEDICINE

## 2021-03-30 RX ADMIN — Medication 0.5 ML: at 08:03

## 2021-04-28 ENCOUNTER — IMMUNIZATION (OUTPATIENT)
Dept: PRIMARY CARE CLINIC | Facility: CLINIC | Age: 56
End: 2021-04-28
Payer: MEDICAID

## 2021-04-28 DIAGNOSIS — Z23 NEED FOR VACCINATION: Primary | ICD-10-CM

## 2021-04-28 PROCEDURE — 91301 PR SARS-COV-2 COVID-19 VACCINE, NO PRSV, 100MCG/0.5ML, IM: ICD-10-PCS | Mod: S$GLB,,, | Performed by: INTERNAL MEDICINE

## 2021-04-28 PROCEDURE — 91301 PR SARS-COV-2 COVID-19 VACCINE, NO PRSV, 100MCG/0.5ML, IM: CPT | Mod: S$GLB,,, | Performed by: INTERNAL MEDICINE

## 2021-04-28 PROCEDURE — 0012A PR IMMUNIZ ADMIN, SARS-COV-2 COVID-19 VACC, 100MCG/0.5ML, 2ND DOSE: ICD-10-PCS | Mod: CV19,S$GLB,, | Performed by: INTERNAL MEDICINE

## 2021-04-28 PROCEDURE — 0012A PR IMMUNIZ ADMIN, SARS-COV-2 COVID-19 VACC, 100MCG/0.5ML, 2ND DOSE: CPT | Mod: CV19,S$GLB,, | Performed by: INTERNAL MEDICINE

## 2021-04-28 RX ADMIN — Medication 0.5 ML: at 07:04

## 2021-10-19 ENCOUNTER — OFFICE VISIT (OUTPATIENT)
Dept: URGENT CARE | Facility: CLINIC | Age: 56
End: 2021-10-19
Payer: MEDICAID

## 2021-10-19 VITALS
BODY MASS INDEX: 25.07 KG/M2 | WEIGHT: 156 LBS | RESPIRATION RATE: 17 BRPM | OXYGEN SATURATION: 96 % | TEMPERATURE: 98 F | HEART RATE: 60 BPM | SYSTOLIC BLOOD PRESSURE: 139 MMHG | HEIGHT: 66 IN | DIASTOLIC BLOOD PRESSURE: 81 MMHG

## 2021-10-19 DIAGNOSIS — J45.909 ASTHMA, UNSPECIFIED ASTHMA SEVERITY, UNSPECIFIED WHETHER COMPLICATED, UNSPECIFIED WHETHER PERSISTENT: ICD-10-CM

## 2021-10-19 DIAGNOSIS — J30.1 ALLERGIC RHINITIS DUE TO POLLEN, UNSPECIFIED SEASONALITY: ICD-10-CM

## 2021-10-19 DIAGNOSIS — J02.9 SORE THROAT: Primary | ICD-10-CM

## 2021-10-19 DIAGNOSIS — Z11.52 ENCOUNTER FOR SCREENING LABORATORY TESTING FOR COVID-19 VIRUS: ICD-10-CM

## 2021-10-19 LAB
CTP QC/QA: YES
CTP QC/QA: YES
MOLECULAR STREP A: NEGATIVE
SARS-COV-2 RDRP RESP QL NAA+PROBE: NEGATIVE

## 2021-10-19 PROCEDURE — U0002 COVID-19 LAB TEST NON-CDC: HCPCS | Mod: QW,S$GLB,, | Performed by: FAMILY MEDICINE

## 2021-10-19 PROCEDURE — 99213 OFFICE O/P EST LOW 20 MIN: CPT | Mod: S$GLB,,, | Performed by: FAMILY MEDICINE

## 2021-10-19 PROCEDURE — 87651 POCT STREP A MOLECULAR: ICD-10-PCS | Mod: QW,S$GLB,, | Performed by: FAMILY MEDICINE

## 2021-10-19 PROCEDURE — 87651 STREP A DNA AMP PROBE: CPT | Mod: QW,S$GLB,, | Performed by: FAMILY MEDICINE

## 2021-10-19 PROCEDURE — U0002: ICD-10-PCS | Mod: QW,S$GLB,, | Performed by: FAMILY MEDICINE

## 2021-10-19 PROCEDURE — 99213 PR OFFICE/OUTPT VISIT, EST, LEVL III, 20-29 MIN: ICD-10-PCS | Mod: S$GLB,,, | Performed by: FAMILY MEDICINE

## 2021-10-19 RX ORDER — MONTELUKAST SODIUM 10 MG/1
10 TABLET ORAL DAILY
Qty: 90 TABLET | Refills: 2 | Status: SHIPPED | OUTPATIENT
Start: 2021-10-19 | End: 2022-10-19

## 2021-10-19 RX ORDER — ALBUTEROL SULFATE 90 UG/1
2 AEROSOL, METERED RESPIRATORY (INHALATION) EVERY 6 HOURS PRN
Qty: 18 G | Refills: 3 | Status: SHIPPED | OUTPATIENT
Start: 2021-10-19 | End: 2024-03-23 | Stop reason: SDUPTHER

## 2021-10-19 RX ORDER — GUAIFENESIN 600 MG/1
600 TABLET, EXTENDED RELEASE ORAL 2 TIMES DAILY
Qty: 14 TABLET | Refills: 2 | Status: SHIPPED | OUTPATIENT
Start: 2021-10-19 | End: 2021-10-26

## 2021-10-19 RX ORDER — ALBUTEROL SULFATE 90 UG/1
2 AEROSOL, METERED RESPIRATORY (INHALATION) EVERY 6 HOURS PRN
Qty: 18 G | Refills: 0 | Status: SHIPPED | OUTPATIENT
Start: 2021-10-19 | End: 2022-10-19

## 2021-10-19 RX ORDER — AZITHROMYCIN 250 MG/1
TABLET, FILM COATED ORAL
Qty: 6 TABLET | Refills: 0 | Status: SHIPPED | OUTPATIENT
Start: 2021-10-19

## 2021-10-19 RX ORDER — LORATADINE 10 MG/1
10 TABLET ORAL DAILY
Qty: 30 TABLET | Refills: 2 | Status: SHIPPED | OUTPATIENT
Start: 2021-10-19 | End: 2022-10-19

## 2021-10-19 RX ORDER — BUDESONIDE AND FORMOTEROL FUMARATE DIHYDRATE 160; 4.5 UG/1; UG/1
2 AEROSOL RESPIRATORY (INHALATION) EVERY 12 HOURS
Qty: 10.2 G | Refills: 3 | Status: SHIPPED | OUTPATIENT
Start: 2021-10-19 | End: 2022-10-19

## 2022-01-27 ENCOUNTER — OFFICE VISIT (OUTPATIENT)
Dept: URGENT CARE | Facility: CLINIC | Age: 57
End: 2022-01-27
Payer: MEDICAID

## 2022-01-27 VITALS
DIASTOLIC BLOOD PRESSURE: 90 MMHG | OXYGEN SATURATION: 98 % | RESPIRATION RATE: 18 BRPM | HEIGHT: 66 IN | SYSTOLIC BLOOD PRESSURE: 156 MMHG | BODY MASS INDEX: 25.07 KG/M2 | TEMPERATURE: 99 F | HEART RATE: 70 BPM | WEIGHT: 156 LBS

## 2022-01-27 DIAGNOSIS — U07.1 COVID-19 VIRUS DETECTED: ICD-10-CM

## 2022-01-27 DIAGNOSIS — J02.9 SORE THROAT: Primary | ICD-10-CM

## 2022-01-27 LAB
CTP QC/QA: YES
CTP QC/QA: YES
MOLECULAR STREP A: NEGATIVE
SARS-COV-2 RDRP RESP QL NAA+PROBE: POSITIVE

## 2022-01-27 PROCEDURE — 87651 STREP A DNA AMP PROBE: CPT | Mod: QW,S$GLB,, | Performed by: FAMILY MEDICINE

## 2022-01-27 PROCEDURE — 3077F PR MOST RECENT SYSTOLIC BLOOD PRESSURE >= 140 MM HG: ICD-10-PCS | Mod: CPTII,S$GLB,, | Performed by: FAMILY MEDICINE

## 2022-01-27 PROCEDURE — 99213 PR OFFICE/OUTPT VISIT, EST, LEVL III, 20-29 MIN: ICD-10-PCS | Mod: S$GLB,,, | Performed by: FAMILY MEDICINE

## 2022-01-27 PROCEDURE — 87651 POCT STREP A MOLECULAR: ICD-10-PCS | Mod: QW,S$GLB,, | Performed by: FAMILY MEDICINE

## 2022-01-27 PROCEDURE — 3008F BODY MASS INDEX DOCD: CPT | Mod: CPTII,S$GLB,, | Performed by: FAMILY MEDICINE

## 2022-01-27 PROCEDURE — U0002 COVID-19 LAB TEST NON-CDC: HCPCS | Mod: QW,S$GLB,, | Performed by: FAMILY MEDICINE

## 2022-01-27 PROCEDURE — 1159F MED LIST DOCD IN RCRD: CPT | Mod: CPTII,S$GLB,, | Performed by: FAMILY MEDICINE

## 2022-01-27 PROCEDURE — 99213 OFFICE O/P EST LOW 20 MIN: CPT | Mod: S$GLB,,, | Performed by: FAMILY MEDICINE

## 2022-01-27 PROCEDURE — 3077F SYST BP >= 140 MM HG: CPT | Mod: CPTII,S$GLB,, | Performed by: FAMILY MEDICINE

## 2022-01-27 PROCEDURE — 1159F PR MEDICATION LIST DOCUMENTED IN MEDICAL RECORD: ICD-10-PCS | Mod: CPTII,S$GLB,, | Performed by: FAMILY MEDICINE

## 2022-01-27 PROCEDURE — 3008F PR BODY MASS INDEX (BMI) DOCUMENTED: ICD-10-PCS | Mod: CPTII,S$GLB,, | Performed by: FAMILY MEDICINE

## 2022-01-27 PROCEDURE — 3080F PR MOST RECENT DIASTOLIC BLOOD PRESSURE >= 90 MM HG: ICD-10-PCS | Mod: CPTII,S$GLB,, | Performed by: FAMILY MEDICINE

## 2022-01-27 PROCEDURE — U0002: ICD-10-PCS | Mod: QW,S$GLB,, | Performed by: FAMILY MEDICINE

## 2022-01-27 PROCEDURE — 3080F DIAST BP >= 90 MM HG: CPT | Mod: CPTII,S$GLB,, | Performed by: FAMILY MEDICINE

## 2022-01-27 RX ORDER — GUAIFENESIN 600 MG/1
600 TABLET, EXTENDED RELEASE ORAL 2 TIMES DAILY
Qty: 14 TABLET | Refills: 2 | Status: SHIPPED | OUTPATIENT
Start: 2022-01-27 | End: 2022-02-03

## 2022-01-27 RX ORDER — LORATADINE 10 MG/1
10 TABLET ORAL DAILY
Qty: 30 TABLET | Refills: 2 | Status: SHIPPED | OUTPATIENT
Start: 2022-01-27 | End: 2023-01-27

## 2022-01-27 NOTE — PROGRESS NOTES
"Subjective:       Patient ID: Beau Cowart is a 56 y.o. male.    Vitals:  height is 5' 6" (1.676 m) and weight is 70.8 kg (156 lb). His temperature is 98.5 °F (36.9 °C). His blood pressure is 156/90 (abnormal) and his pulse is 70. His respiration is 18 and oxygen saturation is 98%.     Chief Complaint: Sore Throat    Sore throat and cough for two days    Sore Throat   This is a new problem. The current episode started in the past 7 days. The problem has been gradually worsening. Neither side of throat is experiencing more pain than the other. He has tried acetaminophen for the symptoms. The treatment provided no relief.       Constitution: Positive for fever.   HENT: Positive for sore throat.        Objective:      Physical Exam      Assessment:       1. Sore throat          Plan:         Sore throat  -     POCT COVID-19 Rapid Screening    Other orders  -     guaiFENesin (MUCINEX) 600 mg 12 hr tablet; Take 1 tablet (600 mg total) by mouth 2 (two) times daily. for 7 days  Dispense: 14 tablet; Refill: 2  -     loratadine (CLARITIN) 10 mg tablet; Take 1 tablet (10 mg total) by mouth once daily.  Dispense: 30 tablet; Refill: 2               Results for orders placed or performed in visit on 01/27/22   POCT COVID-19 Rapid Screening   Result Value Ref Range    POC Rapid COVID Positive (A) Negative     Acceptable Yes            Results for orders placed or performed in visit on 01/27/22   POCT COVID-19 Rapid Screening   Result Value Ref Range    POC Rapid COVID Positive (A) Negative     Acceptable Yes    POCT Strep A, Molecular   Result Value Ref Range    Molecular Strep A, POC Negative Negative     Acceptable Yes         Quarnatine    Symptomatic treatment      "

## 2023-01-05 ENCOUNTER — OFFICE VISIT (OUTPATIENT)
Dept: URGENT CARE | Facility: CLINIC | Age: 58
End: 2023-01-05
Payer: MEDICAID

## 2023-01-05 VITALS
BODY MASS INDEX: 25.18 KG/M2 | HEART RATE: 81 BPM | SYSTOLIC BLOOD PRESSURE: 121 MMHG | DIASTOLIC BLOOD PRESSURE: 77 MMHG | TEMPERATURE: 99 F | WEIGHT: 156 LBS | OXYGEN SATURATION: 95 % | RESPIRATION RATE: 20 BRPM

## 2023-01-05 DIAGNOSIS — J11.1 INFLUENZA: ICD-10-CM

## 2023-01-05 DIAGNOSIS — R09.81 SINUS CONGESTION: Primary | ICD-10-CM

## 2023-01-05 LAB
CTP QC/QA: YES
CTP QC/QA: YES
POC MOLECULAR INFLUENZA A AGN: POSITIVE
POC MOLECULAR INFLUENZA B AGN: NEGATIVE
SARS-COV-2 AG RESP QL IA.RAPID: NEGATIVE

## 2023-01-05 PROCEDURE — 87502 POCT INFLUENZA A/B MOLECULAR: ICD-10-PCS | Mod: QW,S$GLB,, | Performed by: FAMILY MEDICINE

## 2023-01-05 PROCEDURE — 1159F PR MEDICATION LIST DOCUMENTED IN MEDICAL RECORD: ICD-10-PCS | Mod: CPTII,S$GLB,, | Performed by: FAMILY MEDICINE

## 2023-01-05 PROCEDURE — 3008F BODY MASS INDEX DOCD: CPT | Mod: CPTII,S$GLB,, | Performed by: FAMILY MEDICINE

## 2023-01-05 PROCEDURE — 3074F SYST BP LT 130 MM HG: CPT | Mod: CPTII,S$GLB,, | Performed by: FAMILY MEDICINE

## 2023-01-05 PROCEDURE — 87811 SARS CORONAVIRUS 2 ANTIGEN POCT, MANUAL READ: ICD-10-PCS | Mod: QW,S$GLB,, | Performed by: FAMILY MEDICINE

## 2023-01-05 PROCEDURE — 99214 PR OFFICE/OUTPT VISIT, EST, LEVL IV, 30-39 MIN: ICD-10-PCS | Mod: S$GLB,,, | Performed by: FAMILY MEDICINE

## 2023-01-05 PROCEDURE — 99214 OFFICE O/P EST MOD 30 MIN: CPT | Mod: S$GLB,,, | Performed by: FAMILY MEDICINE

## 2023-01-05 PROCEDURE — 3074F PR MOST RECENT SYSTOLIC BLOOD PRESSURE < 130 MM HG: ICD-10-PCS | Mod: CPTII,S$GLB,, | Performed by: FAMILY MEDICINE

## 2023-01-05 PROCEDURE — 3078F PR MOST RECENT DIASTOLIC BLOOD PRESSURE < 80 MM HG: ICD-10-PCS | Mod: CPTII,S$GLB,, | Performed by: FAMILY MEDICINE

## 2023-01-05 PROCEDURE — 1159F MED LIST DOCD IN RCRD: CPT | Mod: CPTII,S$GLB,, | Performed by: FAMILY MEDICINE

## 2023-01-05 PROCEDURE — 87811 SARS-COV-2 COVID19 W/OPTIC: CPT | Mod: QW,S$GLB,, | Performed by: FAMILY MEDICINE

## 2023-01-05 PROCEDURE — 3078F DIAST BP <80 MM HG: CPT | Mod: CPTII,S$GLB,, | Performed by: FAMILY MEDICINE

## 2023-01-05 PROCEDURE — 3008F PR BODY MASS INDEX (BMI) DOCUMENTED: ICD-10-PCS | Mod: CPTII,S$GLB,, | Performed by: FAMILY MEDICINE

## 2023-01-05 PROCEDURE — 87502 INFLUENZA DNA AMP PROBE: CPT | Mod: QW,S$GLB,, | Performed by: FAMILY MEDICINE

## 2023-01-05 RX ORDER — OSELTAMIVIR PHOSPHATE 75 MG/1
75 CAPSULE ORAL 2 TIMES DAILY
Qty: 10 CAPSULE | Refills: 0 | Status: SHIPPED | OUTPATIENT
Start: 2023-01-05 | End: 2023-01-10

## 2023-01-05 RX ORDER — LORATADINE 10 MG/1
10 TABLET ORAL DAILY
Qty: 30 TABLET | Refills: 2 | Status: SHIPPED | OUTPATIENT
Start: 2023-01-05 | End: 2024-01-05

## 2023-01-05 RX ORDER — PREDNISONE 20 MG/1
20 TABLET ORAL DAILY
Qty: 15 TABLET | Refills: 0 | Status: SHIPPED | OUTPATIENT
Start: 2023-01-05 | End: 2023-01-20

## 2023-01-05 RX ORDER — MONTELUKAST SODIUM 10 MG/1
10 TABLET ORAL DAILY
Qty: 30 TABLET | Refills: 2 | Status: SHIPPED | OUTPATIENT
Start: 2023-01-05 | End: 2024-01-05

## 2023-01-05 RX ORDER — BENZONATATE 100 MG/1
200 CAPSULE ORAL 3 TIMES DAILY PRN
Qty: 30 CAPSULE | Refills: 1 | Status: SHIPPED | OUTPATIENT
Start: 2023-01-05

## 2023-01-05 NOTE — PROGRESS NOTES
Subjective:       Patient ID: Beau Cowart is a 57 y.o. male.    Vitals:  weight is 70.8 kg (156 lb). His temperature is 99 °F (37.2 °C). His blood pressure is 121/77 and his pulse is 81. His respiration is 20 and oxygen saturation is 95%.     Chief Complaint: Sinus Problem    Pt is complaining sore throat, cough, runny nose that started Tuesday. He tried mucinex and tylenol.    URI   This is a new problem. The current episode started in the past 7 days. There has been no fever. Associated symptoms include congestion, coughing, sinus pain, sneezing and a sore throat. Pertinent negatives include no diarrhea, dysuria, ear pain, headaches, joint pain, joint swelling, nausea, neck pain, plugged ear sensation, rash, rhinorrhea, swollen glands, vomiting or wheezing. He has tried acetaminophen and decongestant for the symptoms. The treatment provided mild relief.     HENT:  Positive for congestion, sinus pain and sore throat. Negative for ear pain.    Neck: Negative for neck pain.   Respiratory:  Positive for cough. Negative for wheezing.    Gastrointestinal:  Negative for nausea, vomiting and diarrhea.   Genitourinary:  Negative for dysuria.   Skin:  Negative for rash.   Allergic/Immunologic: Positive for sneezing.   Neurological:  Negative for headaches.     Objective:      Physical Exam      Assessment:       1. Sinus congestion          Plan:         Sinus congestion  -     SARS Coronavirus 2 Antigen, POCT Manual Read  -     POCT Influenza A/B MOLECULAR         Results for orders placed or performed in visit on 01/05/23   SARS Coronavirus 2 Antigen, POCT Manual Read   Result Value Ref Range    SARS Coronavirus 2 Antigen Negative Negative     Acceptable Yes    POCT Influenza A/B MOLECULAR   Result Value Ref Range    POC Molecular Influenza A Ag Positive (A) Negative, Not Reported    POC Molecular Influenza B Ag Negative Negative, Not Reported     Acceptable Yes

## 2024-03-23 ENCOUNTER — TELEPHONE (OUTPATIENT)
Dept: URGENT CARE | Facility: CLINIC | Age: 59
End: 2024-03-23
Payer: MEDICAID

## 2024-03-23 RX ORDER — LORATADINE 10 MG/1
10 TABLET ORAL DAILY
Qty: 30 TABLET | Refills: 2 | Status: SHIPPED | OUTPATIENT
Start: 2024-03-23 | End: 2025-03-23

## 2024-03-23 RX ORDER — PROMETHAZINE HYDROCHLORIDE AND DEXTROMETHORPHAN HYDROBROMIDE 6.25; 15 MG/5ML; MG/5ML
SYRUP ORAL
Qty: 180 ML | Refills: 0 | Status: SHIPPED | OUTPATIENT
Start: 2024-03-23

## 2024-03-23 RX ORDER — ALBUTEROL SULFATE 90 UG/1
2 AEROSOL, METERED RESPIRATORY (INHALATION) EVERY 6 HOURS PRN
Qty: 18 G | Refills: 3 | Status: SHIPPED | OUTPATIENT
Start: 2024-03-23

## 2024-03-23 RX ORDER — GUAIFENESIN 600 MG/1
600 TABLET, EXTENDED RELEASE ORAL 2 TIMES DAILY
Qty: 14 TABLET | Refills: 2 | Status: SHIPPED | OUTPATIENT
Start: 2024-03-23 | End: 2024-03-30

## 2024-03-23 NOTE — TELEPHONE ENCOUNTER
C/o persistent cough and chest congestion  Has hx of asthma  Denies fever or chills  Requesting cough medication  Dr. Carvajal

## 2024-10-15 ENCOUNTER — TELEPHONE (OUTPATIENT)
Dept: FAMILY MEDICINE | Facility: CLINIC | Age: 59
End: 2024-10-15
Payer: MEDICAID

## 2024-10-15 RX ORDER — AZITHROMYCIN 250 MG/1
TABLET, FILM COATED ORAL
Qty: 6 TABLET | Refills: 0 | Status: SHIPPED | OUTPATIENT
Start: 2024-10-15

## 2024-10-15 RX ORDER — PROMETHAZINE HYDROCHLORIDE AND DEXTROMETHORPHAN HYDROBROMIDE 6.25; 15 MG/5ML; MG/5ML
SYRUP ORAL
Qty: 180 ML | Refills: 0 | Status: SHIPPED | OUTPATIENT
Start: 2024-10-15

## 2024-10-15 NOTE — TELEPHONE ENCOUNTER
Pt requesting medication for cough going on for over a week, denies any  Fever, chills or SOB  No chest pain no wheezing  Will call in cough syrup  Dr. Carvajal